# Patient Record
Sex: MALE | Race: WHITE | NOT HISPANIC OR LATINO | URBAN - METROPOLITAN AREA
[De-identification: names, ages, dates, MRNs, and addresses within clinical notes are randomized per-mention and may not be internally consistent; named-entity substitution may affect disease eponyms.]

---

## 2017-04-15 RX ORDER — AMLODIPINE BESYLATE 2.5 MG/1
0 TABLET ORAL
Qty: 90 | Refills: 0 | COMMUNITY
Start: 2017-04-15

## 2017-04-15 RX ORDER — LOSARTAN/HYDROCHLOROTHIAZIDE 100MG-25MG
1 TABLET ORAL
Qty: 90 | Refills: 0 | DISCHARGE
Start: 2017-04-15

## 2017-04-15 RX ORDER — AMLODIPINE BESYLATE 2.5 MG/1
1 TABLET ORAL
Qty: 90 | Refills: 0 | COMMUNITY
Start: 2017-04-15

## 2017-06-08 RX ORDER — LEVOTHYROXINE SODIUM 125 MCG
0 TABLET ORAL
Qty: 90 | Refills: 0 | COMMUNITY
Start: 2017-06-08

## 2017-06-08 RX ORDER — LEVOTHYROXINE SODIUM 125 MCG
1 TABLET ORAL
Qty: 90 | Refills: 0 | COMMUNITY
Start: 2017-06-08

## 2017-06-24 ENCOUNTER — INPATIENT (INPATIENT)
Facility: HOSPITAL | Age: 72
LOS: 2 days | Discharge: HOME | End: 2017-06-27
Attending: INTERNAL MEDICINE | Admitting: INTERNAL MEDICINE
Payer: COMMERCIAL

## 2017-06-24 VITALS
SYSTOLIC BLOOD PRESSURE: 188 MMHG | TEMPERATURE: 99 F | RESPIRATION RATE: 14 BRPM | HEART RATE: 37 BPM | DIASTOLIC BLOOD PRESSURE: 83 MMHG | OXYGEN SATURATION: 100 %

## 2017-06-24 DIAGNOSIS — R00.1 BRADYCARDIA, UNSPECIFIED: ICD-10-CM

## 2017-06-24 DIAGNOSIS — I10 ESSENTIAL (PRIMARY) HYPERTENSION: ICD-10-CM

## 2017-06-24 DIAGNOSIS — E03.9 HYPOTHYROIDISM, UNSPECIFIED: ICD-10-CM

## 2017-06-24 DIAGNOSIS — Z29.9 ENCOUNTER FOR PROPHYLACTIC MEASURES, UNSPECIFIED: ICD-10-CM

## 2017-06-24 LAB
ALBUMIN SERPL ELPH-MCNC: 4.5 G/DL — SIGNIFICANT CHANGE UP (ref 3.3–5)
ALP SERPL-CCNC: 66 U/L — SIGNIFICANT CHANGE UP (ref 40–120)
ALT FLD-CCNC: 20 U/L — SIGNIFICANT CHANGE UP (ref 4–41)
AST SERPL-CCNC: 23 U/L — SIGNIFICANT CHANGE UP (ref 4–40)
BASOPHILS # BLD AUTO: 0.01 K/UL — SIGNIFICANT CHANGE UP (ref 0–0.2)
BASOPHILS NFR BLD AUTO: 0.2 % — SIGNIFICANT CHANGE UP (ref 0–2)
BILIRUB SERPL-MCNC: 0.6 MG/DL — SIGNIFICANT CHANGE UP (ref 0.2–1.2)
BUN SERPL-MCNC: 30 MG/DL — HIGH (ref 7–23)
CALCIUM SERPL-MCNC: 9.8 MG/DL — SIGNIFICANT CHANGE UP (ref 8.4–10.5)
CHLORIDE SERPL-SCNC: 100 MMOL/L — SIGNIFICANT CHANGE UP (ref 98–107)
CK MB BLD-MCNC: 0.7 — SIGNIFICANT CHANGE UP (ref 0–2.5)
CK MB BLD-MCNC: 2.7 NG/ML — SIGNIFICANT CHANGE UP (ref 1–6.6)
CK MB BLD-MCNC: 3.05 NG/ML — SIGNIFICANT CHANGE UP (ref 1–6.6)
CK SERPL-CCNC: 385 U/L — HIGH (ref 30–200)
CK SERPL-CCNC: 467 U/L — HIGH (ref 30–200)
CO2 SERPL-SCNC: 26 MMOL/L — SIGNIFICANT CHANGE UP (ref 22–31)
CREAT SERPL-MCNC: 1.71 MG/DL — HIGH (ref 0.5–1.3)
EOSINOPHIL # BLD AUTO: 0.09 K/UL — SIGNIFICANT CHANGE UP (ref 0–0.5)
EOSINOPHIL NFR BLD AUTO: 1.6 % — SIGNIFICANT CHANGE UP (ref 0–6)
GLUCOSE SERPL-MCNC: 88 MG/DL — SIGNIFICANT CHANGE UP (ref 70–99)
HCT VFR BLD CALC: 42.5 % — SIGNIFICANT CHANGE UP (ref 39–50)
HGB BLD-MCNC: 13.9 G/DL — SIGNIFICANT CHANGE UP (ref 13–17)
IMM GRANULOCYTES NFR BLD AUTO: 0 % — SIGNIFICANT CHANGE UP (ref 0–1.5)
LYMPHOCYTES # BLD AUTO: 1.12 K/UL — SIGNIFICANT CHANGE UP (ref 1–3.3)
LYMPHOCYTES # BLD AUTO: 19.6 % — SIGNIFICANT CHANGE UP (ref 13–44)
MCHC RBC-ENTMCNC: 31 PG — SIGNIFICANT CHANGE UP (ref 27–34)
MCHC RBC-ENTMCNC: 32.7 % — SIGNIFICANT CHANGE UP (ref 32–36)
MCV RBC AUTO: 94.9 FL — SIGNIFICANT CHANGE UP (ref 80–100)
MONOCYTES # BLD AUTO: 0.2 K/UL — SIGNIFICANT CHANGE UP (ref 0–0.9)
MONOCYTES NFR BLD AUTO: 3.5 % — SIGNIFICANT CHANGE UP (ref 2–14)
NEUTROPHILS # BLD AUTO: 4.3 K/UL — SIGNIFICANT CHANGE UP (ref 1.8–7.4)
NEUTROPHILS NFR BLD AUTO: 75.1 % — SIGNIFICANT CHANGE UP (ref 43–77)
PLATELET # BLD AUTO: 162 K/UL — SIGNIFICANT CHANGE UP (ref 150–400)
PMV BLD: 12 FL — SIGNIFICANT CHANGE UP (ref 7–13)
POTASSIUM SERPL-MCNC: 3.9 MMOL/L — SIGNIFICANT CHANGE UP (ref 3.5–5.3)
POTASSIUM SERPL-SCNC: 3.9 MMOL/L — SIGNIFICANT CHANGE UP (ref 3.5–5.3)
PROT SERPL-MCNC: 7.6 G/DL — SIGNIFICANT CHANGE UP (ref 6–8.3)
RBC # BLD: 4.48 M/UL — SIGNIFICANT CHANGE UP (ref 4.2–5.8)
RBC # FLD: 14.1 % — SIGNIFICANT CHANGE UP (ref 10.3–14.5)
SODIUM SERPL-SCNC: 143 MMOL/L — SIGNIFICANT CHANGE UP (ref 135–145)
TROPONIN T SERPL-MCNC: < 0.06 NG/ML — SIGNIFICANT CHANGE UP (ref 0–0.06)
TROPONIN T SERPL-MCNC: < 0.06 NG/ML — SIGNIFICANT CHANGE UP (ref 0–0.06)
TSH SERPL-MCNC: 1.6 UIU/ML — SIGNIFICANT CHANGE UP (ref 0.27–4.2)
WBC # BLD: 5.72 K/UL — SIGNIFICANT CHANGE UP (ref 3.8–10.5)
WBC # FLD AUTO: 5.72 K/UL — SIGNIFICANT CHANGE UP (ref 3.8–10.5)

## 2017-06-24 PROCEDURE — 71020: CPT | Mod: 26

## 2017-06-24 RX ORDER — AMLODIPINE BESYLATE 2.5 MG/1
5 TABLET ORAL DAILY
Qty: 0 | Refills: 0 | Status: DISCONTINUED | OUTPATIENT
Start: 2017-06-24 | End: 2017-06-27

## 2017-06-24 RX ORDER — HYDROCHLOROTHIAZIDE 25 MG
12.5 TABLET ORAL DAILY
Qty: 0 | Refills: 0 | Status: DISCONTINUED | OUTPATIENT
Start: 2017-06-24 | End: 2017-06-27

## 2017-06-24 RX ORDER — SODIUM CHLORIDE 9 MG/ML
3 INJECTION INTRAMUSCULAR; INTRAVENOUS; SUBCUTANEOUS EVERY 8 HOURS
Qty: 0 | Refills: 0 | Status: DISCONTINUED | OUTPATIENT
Start: 2017-06-24 | End: 2017-06-27

## 2017-06-24 RX ORDER — LEVOTHYROXINE SODIUM 125 MCG
100 TABLET ORAL DAILY
Qty: 0 | Refills: 0 | Status: DISCONTINUED | OUTPATIENT
Start: 2017-06-24 | End: 2017-06-27

## 2017-06-24 RX ORDER — SODIUM CHLORIDE 9 MG/ML
1000 INJECTION INTRAMUSCULAR; INTRAVENOUS; SUBCUTANEOUS ONCE
Qty: 0 | Refills: 0 | Status: COMPLETED | OUTPATIENT
Start: 2017-06-24 | End: 2017-06-24

## 2017-06-24 RX ORDER — LOSARTAN POTASSIUM 100 MG/1
100 TABLET, FILM COATED ORAL DAILY
Qty: 0 | Refills: 0 | Status: DISCONTINUED | OUTPATIENT
Start: 2017-06-24 | End: 2017-06-27

## 2017-06-24 RX ORDER — HEPARIN SODIUM 5000 [USP'U]/ML
5000 INJECTION INTRAVENOUS; SUBCUTANEOUS EVERY 12 HOURS
Qty: 0 | Refills: 0 | Status: DISCONTINUED | OUTPATIENT
Start: 2017-06-24 | End: 2017-06-27

## 2017-06-24 RX ADMIN — SODIUM CHLORIDE 1000 MILLILITER(S): 9 INJECTION INTRAMUSCULAR; INTRAVENOUS; SUBCUTANEOUS at 16:42

## 2017-06-24 RX ADMIN — AMLODIPINE BESYLATE 5 MILLIGRAM(S): 2.5 TABLET ORAL at 22:52

## 2017-06-24 NOTE — ED ADULT TRIAGE NOTE - CHIEF COMPLAINT QUOTE
Patient complains of a sensation to the right side of his head while he was sitting in [employer] Patient denies chest pain nausea vomiting no c/o of sob. patient Bradycardic at triage.

## 2017-06-24 NOTE — H&P ADULT - NSHPSOCIALHISTORY_GEN_ALL_CORE
Lives with family  Work as an  for [employer]  No smoking or alcohol  Uses CPAP QHS

## 2017-06-24 NOTE — ED PROVIDER NOTE - PROGRESS NOTE DETAILS
Spoke with Dr. Han covering Dr. Fowler (pt's cards) - does not admit here  Discussed need to admit with patient & discussed risk and benefits.  Patient agreed to admission.  Discussed case w/ admitting cardiologist - agreed to admit to their service.  Tele PA contacted.

## 2017-06-24 NOTE — ED PROVIDER NOTE - ATTENDING CONTRIBUTION TO CARE
I performed a face-to-face evaluation of the patient and performed a history and physical examination. I agree with the history and physical examination.    72 M, h/o bradycardia (Cards = Geoff Fowler), p/w lightheadness and impending doom. HR 37, moderate HTN. EKG: S. mehrdad. Appears well. NAD. No LE edema. Concern for pre-syncope. Check TSH, trop. Admit for consideration for pacer.

## 2017-06-24 NOTE — ED PROVIDER NOTE - MEDICAL DECISION MAKING DETAILS
73yo male with pmh of hypothyroidism, HTN and bradycardia presents with lightheadedness - syncope work up

## 2017-06-24 NOTE — ED ADULT NURSE REASSESSMENT NOTE - NS ED NURSE REASSESS COMMENT FT1
Pt report given to ALANIS Cabrera on 7th floor. Pt remains bradycardic. RN made aware. Awaiting transport to the floor.

## 2017-06-24 NOTE — ED PROVIDER NOTE - OBJECTIVE STATEMENT
73yo male with pmh of hypothyroidism, HTN and bradycardia presents with lightheadedness. Pt states he was sitting in [employer] had sudden onset of "impending doom" felt lightheaded and unwell. PT denies cp/sob/palp, abd pain/n/v/d, fevers/chills, recent sickness and travel. Pt states similar episode about a year ago.    PMD - Dr. Jorge Rendon

## 2017-06-24 NOTE — H&P ADULT - ASSESSMENT
71 y/o M with hx of hypothyroidism, HTN, and bradycardia presents with lightheadedness admitted for bradycardia

## 2017-06-24 NOTE — ED PROVIDER NOTE - PSH
Hernia NEC  repair approximately 10 years ago  History of appendectomy  1990's  Lipoma  multiple  times - last 10 years from neck and thigh in past  Ulnar nerve entrapment  right wrist decompression - 2000

## 2017-06-24 NOTE — H&P ADULT - PROBLEM SELECTOR PLAN 1
Admit to tele  Pacer pads on patient  consider EP for possible PPM  Avoid AV shara blockers  f/u MD note

## 2017-06-24 NOTE — ED ADULT NURSE NOTE - OBJECTIVE STATEMENT
73 y/o male pt, rcvd in rm 3, aox3, ambulatory, presents to the ed with c/o "lousy feeling" on the head and lightheadedness that started this morning. Pt denies any dizziness, vision changes, chest pain, sob. Bradycardiac in the ED, HR in the high 30's/ Hx HTN. MD thomas done, SL placed, labs sent, VS as noted, connected to the monitor:sinus mehrdad, NAD, will cont to monitor

## 2017-06-24 NOTE — ED PROVIDER NOTE - PMH
Bulging disc  cervical spine 10 years ago - deneies any radicular pain  Depression  5 years  Erectile dysfunction  takes viagra prn  Hemorrhoids without mention of complications    HTN (hypertension)  10 years  Hypothyroidism  last tft's - 1 month ago  IBS (irritable bowel syndrome)    Osteoarthritis    Polycystic kidney  vague history unsure as to diagnosis, states "functioning normal "

## 2017-06-25 LAB
BASOPHILS # BLD AUTO: 0.01 K/UL — SIGNIFICANT CHANGE UP (ref 0–0.2)
BASOPHILS NFR BLD AUTO: 0.2 % — SIGNIFICANT CHANGE UP (ref 0–2)
BUN SERPL-MCNC: 34 MG/DL — HIGH (ref 7–23)
CALCIUM SERPL-MCNC: 9.7 MG/DL — SIGNIFICANT CHANGE UP (ref 8.4–10.5)
CHLORIDE SERPL-SCNC: 104 MMOL/L — SIGNIFICANT CHANGE UP (ref 98–107)
CHOLEST SERPL-MCNC: 190 MG/DL — SIGNIFICANT CHANGE UP (ref 120–199)
CO2 SERPL-SCNC: 28 MMOL/L — SIGNIFICANT CHANGE UP (ref 22–31)
CREAT SERPL-MCNC: 1.71 MG/DL — HIGH (ref 0.5–1.3)
EOSINOPHIL # BLD AUTO: 0.08 K/UL — SIGNIFICANT CHANGE UP (ref 0–0.5)
EOSINOPHIL NFR BLD AUTO: 1.8 % — SIGNIFICANT CHANGE UP (ref 0–6)
GLUCOSE SERPL-MCNC: 100 MG/DL — HIGH (ref 70–99)
HBA1C BLD-MCNC: 6.2 % — HIGH (ref 4–5.6)
HCT VFR BLD CALC: 41.3 % — SIGNIFICANT CHANGE UP (ref 39–50)
HDLC SERPL-MCNC: 77 MG/DL — HIGH (ref 35–55)
HGB BLD-MCNC: 13.6 G/DL — SIGNIFICANT CHANGE UP (ref 13–17)
IMM GRANULOCYTES NFR BLD AUTO: 0.2 % — SIGNIFICANT CHANGE UP (ref 0–1.5)
LIPID PNL WITH DIRECT LDL SERPL: 110 MG/DL — SIGNIFICANT CHANGE UP
LYMPHOCYTES # BLD AUTO: 1.22 K/UL — SIGNIFICANT CHANGE UP (ref 1–3.3)
LYMPHOCYTES # BLD AUTO: 27.6 % — SIGNIFICANT CHANGE UP (ref 13–44)
MAGNESIUM SERPL-MCNC: 2.1 MG/DL — SIGNIFICANT CHANGE UP (ref 1.6–2.6)
MCHC RBC-ENTMCNC: 31.2 PG — SIGNIFICANT CHANGE UP (ref 27–34)
MCHC RBC-ENTMCNC: 32.9 % — SIGNIFICANT CHANGE UP (ref 32–36)
MCV RBC AUTO: 94.7 FL — SIGNIFICANT CHANGE UP (ref 80–100)
MONOCYTES # BLD AUTO: 0.19 K/UL — SIGNIFICANT CHANGE UP (ref 0–0.9)
MONOCYTES NFR BLD AUTO: 4.3 % — SIGNIFICANT CHANGE UP (ref 2–14)
NEUTROPHILS # BLD AUTO: 2.91 K/UL — SIGNIFICANT CHANGE UP (ref 1.8–7.4)
NEUTROPHILS NFR BLD AUTO: 65.9 % — SIGNIFICANT CHANGE UP (ref 43–77)
PHOSPHATE SERPL-MCNC: 3.1 MG/DL — SIGNIFICANT CHANGE UP (ref 2.5–4.5)
PLATELET # BLD AUTO: 167 K/UL — SIGNIFICANT CHANGE UP (ref 150–400)
PMV BLD: 12.2 FL — SIGNIFICANT CHANGE UP (ref 7–13)
POTASSIUM SERPL-MCNC: 4.3 MMOL/L — SIGNIFICANT CHANGE UP (ref 3.5–5.3)
POTASSIUM SERPL-SCNC: 4.3 MMOL/L — SIGNIFICANT CHANGE UP (ref 3.5–5.3)
RBC # BLD: 4.36 M/UL — SIGNIFICANT CHANGE UP (ref 4.2–5.8)
RBC # FLD: 14.1 % — SIGNIFICANT CHANGE UP (ref 10.3–14.5)
SODIUM SERPL-SCNC: 143 MMOL/L — SIGNIFICANT CHANGE UP (ref 135–145)
T4 FREE SERPL-MCNC: 1.19 NG/DL — SIGNIFICANT CHANGE UP (ref 0.9–1.8)
TRIGL SERPL-MCNC: 92 MG/DL — SIGNIFICANT CHANGE UP (ref 10–149)
WBC # BLD: 4.42 K/UL — SIGNIFICANT CHANGE UP (ref 3.8–10.5)
WBC # FLD AUTO: 4.42 K/UL — SIGNIFICANT CHANGE UP (ref 3.8–10.5)

## 2017-06-25 RX ADMIN — SODIUM CHLORIDE 3 MILLILITER(S): 9 INJECTION INTRAMUSCULAR; INTRAVENOUS; SUBCUTANEOUS at 21:41

## 2017-06-25 RX ADMIN — AMLODIPINE BESYLATE 5 MILLIGRAM(S): 2.5 TABLET ORAL at 21:40

## 2017-06-25 RX ADMIN — Medication 100 MICROGRAM(S): at 05:24

## 2017-06-25 RX ADMIN — SODIUM CHLORIDE 3 MILLILITER(S): 9 INJECTION INTRAMUSCULAR; INTRAVENOUS; SUBCUTANEOUS at 15:40

## 2017-06-25 RX ADMIN — SODIUM CHLORIDE 3 MILLILITER(S): 9 INJECTION INTRAMUSCULAR; INTRAVENOUS; SUBCUTANEOUS at 05:25

## 2017-06-25 RX ADMIN — Medication 12.5 MILLIGRAM(S): at 05:24

## 2017-06-25 RX ADMIN — LOSARTAN POTASSIUM 100 MILLIGRAM(S): 100 TABLET, FILM COATED ORAL at 05:24

## 2017-06-25 NOTE — CONSULT NOTE ADULT - SUBJECTIVE AND OBJECTIVE BOX
HISTORY OF PRESENT ILLNESS:  71 y/o M with hx of hypothyroidism, HTN, and bradycardia presents with lightheadedness. Patient states he was sitting at [employer] and he felt lightheaded and a feeling that is very hard for him to explain. He felt as if something bad was going to occur.  Denies falls, LOC, chest pain, SOB, fever, chills, SOB, palpitations, melena, hematochezia, LE edema, calf tenderness, diarrhea, or constipation.       PAST MEDICAL & SURGICAL HISTORY:  Polycystic kidney: vague history unsure as to diagnosis,   Hemorrhoids without mention of complications  Depression: 5 years  Bulging disc: cervical spine 10 years ago - denies any radicular pain  Osteoarthritis  IBS (irritable bowel syndrome)  Erectile dysfunction: takes Viagra prn  Hypothyroidism: last tft&#x27;s - 1 month ago  HTN (hypertension): 10 years  Hernia NEC: repair approximately 10 years ago  Ulnar nerve entrapment: right wrist decompression - 2000  Lipoma: multiple  times - last 10 years from neck and thigh in past  History of appendectomy: 1990&#x27;s        PREVIOUS DIAGNOSTIC TESTING:    [ ] Echocardiogram: 2013   EF 70%  Nl LVS fx/ RV fx    mild MR/ TR   [ ]  Catheterization:  [ ] Stress Test:  	2013  EF 51%  normal study       MEDICATIONS:  losartan 100milliGRAM(s) Oral daily  hydrochlorothiazide 12.5milliGRAM(s) Oral daily  amLODIPine   Tablet 5milliGRAM(s) Oral daily  heparin  Injectable 5000Unit(s) SubCutaneous every 12 hours            levothyroxine 100MICROGram(s) Oral daily        Allergies    No Known Allergies    Intolerances        FAMILY HISTORY:      SOCIAL HISTORY:    [ x] Non-smoker  [ ] Former Smoker  [ ] Current Smoker  [ ] Alcohol      REVIEW OF SYSTEMS:  [ ]chest pain  [  ]shortness of breath  [  ]palpitations  [  ]syncope  [x ]near syncope [  ]diplopia  [  ]altered mental status   [  ]fevers  [ ]chills [ ]nausea  [ vomiting  [ ]abdominal pain  [ ]melena  [ ]BRBPR  [  ]epistaxis  [  ]rash  [  ]lower extremity edema          [x ] All others negative	  [ ] Unable to obtain    PHYSICAL EXAM:  T(C): 36.9, Max: 36.9 (06-25 @ 14:43)  HR: 75 (41 - 88)  BP: 119/83 (119/83 - 183/90)  RR: 16 (13 - 18)  SpO2: 95% (95% - 100%)  Wt(kg): --  I&O's Summary  I & Os for 24h ending 25 Jun 2017 07:00  =============================================  IN: 414 ml / OUT: 1200 ml / NET: -786 ml    I & Os for current day (as of 25 Jun 2017 16:42)  =============================================  IN: 0 ml / OUT: 925 ml / NET: -925 ml      Appearance: Normal no sign of acute distress   HEENT:   Normal oral mucosa, PERRL, conjunctiva clear	  Lymphatic: No lymphadenopathy  Cardiovascular: Normal S1 S2, RRR No JVD, No murmurs, No edema  Respiratory: Lungs clear to auscultation	  Gastrointestinal:  Soft, Non-tender, + BS	  Skin: No rashes, No ecchymoses, No cyanosis	  Neurologic: Non-focal  Extremities: AROM WFL's, No clubbing, cyanosis + LE edema dependent position  Vascular: Peripheral pulses palpable 2+ bilaterally    TELEMETRY: 	  NSR 30-70's  ECG:  sinus bradycardia  Non-specific intra-ventricular conduction delay  	  RADIOLOGY  CXR   clear lungs   OTHER: 	  	  LABS:	 	    CARDIAC MARKERS:      CKMB: 2.70 ng/mL (06-24 @ 22:48)    CKMB Relative Index: 0.7 (06-24 @ 22:48)                            13.6   4.42  )-----------( 167      ( 25 Jun 2017 03:30 )             41.3     06-25    143  |  104  |  34<H>  ----------------------------<  100<H>  4.3   |  28  |  1.71<H>    Ca    9.7      25 Jun 2017 03:30  Phos  3.1     06-25  Mg     2.1     06-25    TPro  7.6  /  Alb  4.5  /  TBili  0.6  /  DBili  x   /  AST  23  /  ALT  20  /  AlkPhos  66  06-24    proBNP:   Lipid Profile:   HgA1c: Hemoglobin A1C, Whole Blood: 6.2 % (06-25 @ 03:30)    TSH: Thyroid Stimulating Hormone, Serum (06.24.17 @ 16:30)    Thyroid Stimulating Hormone, Serum: 1.60 uIU/mL        ASSESSMENT/PLAN: 	  71 y/o M with hx of hypothyroidism, HTN, and bradycardia presents with lightheadedness/ near syncope.  off Beta blockers   avoid AV shara blockers  TSH nl   c/w tele monitoring  f/u Echo   further EP  recommendations based on above HISTORY OF PRESENT ILLNESS:  73 y/o M with hx of hypothyroidism, HTN ,SHRUTHI on Cpap and bradycardia presents with lightheadedness. Patient states he was sitting [employer] and he felt lightheaded and a feeling that is very hard for him to explain. He felt as if something bad was going to occur.  Denies falls, LOC, chest pain, SOB, fever, chills, SOB, palpitations, melena, hematochezia, LE edema, calf tenderness, diarrhea, or constipation.       PAST MEDICAL & SURGICAL HISTORY:  Polycystic kidney: vague history unsure as to diagnosis,   Hemorrhoids without mention of complications  Depression: 5 years  Bulging disc: cervical spine 10 years ago - denies any radicular pain  Osteoarthritis  IBS (irritable bowel syndrome)  Erectile dysfunction: takes Viagra prn  Hypothyroidism: last tft&#x27;s - 1 month ago  HTN (hypertension): 10 years  Hernia NEC: repair approximately 10 years ago  Ulnar nerve entrapment: right wrist decompression - 2000  Lipoma: multiple  times - last 10 years from neck and thigh in past  History of appendectomy: 1990&#x27;s        PREVIOUS DIAGNOSTIC TESTING:    [ ] Echocardiogram: 2013   EF 70%  Nl LVS fx/ RV fx    mild MR/ TR   [ ]  Catheterization:  [ ] Stress Test:  	2013  EF 51%  normal study       MEDICATIONS:  losartan 100milliGRAM(s) Oral daily  hydrochlorothiazide 12.5milliGRAM(s) Oral daily  amLODIPine   Tablet 5milliGRAM(s) Oral daily  heparin  Injectable 5000Unit(s) SubCutaneous every 12 hours            levothyroxine 100MICROGram(s) Oral daily        Allergies    No Known Allergies    Intolerances        FAMILY HISTORY:      SOCIAL HISTORY:    [ x] Non-smoker  [ ] Former Smoker  [ ] Current Smoker  [ ] Alcohol      REVIEW OF SYSTEMS:  [ ]chest pain  [  ]shortness of breath  [  ]palpitations  [  ]syncope  [x ]near syncope [  ]diplopia  [  ]altered mental status   [  ]fevers  [ ]chills [ ]nausea  [ vomiting  [ ]abdominal pain  [ ]melena  [ ]BRBPR  [  ]epistaxis  [  ]rash  [  ]lower extremity edema          [x ] All others negative	  [ ] Unable to obtain    PHYSICAL EXAM:  T(C): 36.9, Max: 36.9 (06-25 @ 14:43)  HR: 75 (41 - 88)  BP: 119/83 (119/83 - 183/90)  RR: 16 (13 - 18)  SpO2: 95% (95% - 100%)  Wt(kg): --  I&O's Summary  I & Os for 24h ending 25 Jun 2017 07:00  =============================================  IN: 414 ml / OUT: 1200 ml / NET: -786 ml    I & Os for current day (as of 25 Jun 2017 16:42)  =============================================  IN: 0 ml / OUT: 925 ml / NET: -925 ml      Appearance: Normal no sign of acute distress   HEENT:   Normal oral mucosa, PERRL, conjunctiva clear	  Lymphatic: No lymphadenopathy  Cardiovascular: Normal S1 S2, RRR No JVD, No murmurs, No edema  Respiratory: Lungs clear to auscultation	  Gastrointestinal:  Soft, Non-tender, + BS	  Skin: No rashes, No ecchymoses, No cyanosis	  Neurologic: Non-focal  Extremities: AROM WFL's, No clubbing, cyanosis + LE edema dependent position  Vascular: Peripheral pulses palpable 2+ bilaterally    TELEMETRY: 	  NSR 30-50's  ECG:  sinus bradycardia  Non-specific intra-ventricular conduction delay  	  RADIOLOGY  CXR   clear lungs   OTHER: 	  	  LABS:	 	    CARDIAC MARKERS:      CKMB: 2.70 ng/mL (06-24 @ 22:48)    CKMB Relative Index: 0.7 (06-24 @ 22:48)                            13.6   4.42  )-----------( 167      ( 25 Jun 2017 03:30 )             41.3     06-25    143  |  104  |  34<H>  ----------------------------<  100<H>  4.3   |  28  |  1.71<H>    Ca    9.7      25 Jun 2017 03:30  Phos  3.1     06-25  Mg     2.1     06-25    TPro  7.6  /  Alb  4.5  /  TBili  0.6  /  DBili  x   /  AST  23  /  ALT  20  /  AlkPhos  66  06-24    proBNP:   Lipid Profile:   HgA1c: Hemoglobin A1C, Whole Blood: 6.2 % (06-25 @ 03:30)    TSH: Thyroid Stimulating Hormone, Serum (06.24.17 @ 16:30)    Thyroid Stimulating Hormone, Serum: 1.60 uIU/mL        ASSESSMENT/PLAN: 	  73 y/o M with hx of hypothyroidism, SHRUTHI on CPaP HTN, and bradycardia presents with lightheadedness/ near syncope.  Reports had bradycardia while sleeping w/ work up in 2013  Symptomatic bradycardia    takes no  Beta blockers   avoid AV shara blockers  TSH nl   c/w tele monitoring  f/u Echo   further EP  recommendations based on above   will f/u W/ attending if candidate for PPM

## 2017-06-25 NOTE — PROGRESS NOTE ADULT - SUBJECTIVE AND OBJECTIVE BOX
SUBJ:73 y/o M with hx of hypothyroidism, HTN, and bradycardia presents with lightheadedness. Noted to be bradycardic in 30's,no chest pain syncope,is off BBlockers      MEDICATIONS  (STANDING):  losartan 100milliGRAM(s) Oral daily  hydrochlorothiazide 12.5milliGRAM(s) Oral daily  amLODIPine   Tablet 5milliGRAM(s) Oral daily  levothyroxine 100MICROGram(s) Oral daily  sodium chloride 0.9% lock flush 3milliLiter(s) IV Push every 8 hours  heparin  Injectable 5000Unit(s) SubCutaneous every 12 hours    Vital Signs Last 24 Hrs  T(C): 36.8, Max: 37 (06-24 @ 15:39)  T(F): 98.3, Max: 98.6 (06-24 @ 15:39)  HR: 79 (37 - 88)  BP: 146/83 (146/83 - 200/78)  RR: 18 (12 - 18)  SpO2: 96% (96% - 100%)    REVIEW OF SYSTEMS:  · CONSTITUTIONAL: no fever and no chills.	  · EYES: no discharge, no irritation, no pain, no redness, and no visual changes.	  · ENMT: Ears: no ear pain and no hearing problems.Nose: no nasal congestion and no nasal drainage.Mouth/Throat: no dysphagia, no hoarseness and no throat pain.Neck: no lumps, no pain, no stiffness and no swollen glands.	  · CARDIOVASCULAR: normal rate and rhythm, no chest pain and no edema.	  · RESPIRATORY: no chest pain, no cough, and no shortness of breath.	  · GASTROINTESTINAL: no abdominal pain, no bloating, no constipation, no diarrhea, no nausea and no vomiting.	  · GENITOURINARY: no dysuria, no frequency, and no hematuria.	  · MUSCULOSKELETAL: no back pain, no gout, no musculoskeletal pain, no neck pain, and no weakness.	  · SKIN: no abrasions, no jaundice, no lesions, no pruritis, and no rashes.	  · NEUROLOGICAL: - - -	  · Neurological [+]: lightheaded	  · Neurological [-]: no difficulty walking/imbalance, no headache, no seizure	    PHYSICAL EXAM:    · CONSTITUTIONAL: Well appearing, well nourished, awake, alert, oriented to person, place, time/situation and in no apparent distress.	  · ENMT: Airway patent, Nasal mucosa clear. Mouth with normal mucosa. Throat has no vesicles, no oropharyngeal exudates and uvula is midline.	  · HEAD: Head atraumatic, normal cephalic shape.	  · HEAD: Head is atraumatic. Head shape is symmetrical.	  · EYES: Clear bilaterally, pupils equal, round and reactive to light.	  · CARDIAC: - - -	  · Cardiac Rhythm: regular	  · Cardiac Rate: BRADYCARDIC	  · Heart Sounds: S1-S2	  · Pedal Edema: absent	  · Capillary Refill: less than or equal to 2 seconds	  · Cardiac Pulses: normal bilaterally	  · RESPIRATORY: Breath sounds clear and equal bilaterally.	  · GASTROINTESTINAL: Abdomen soft, non-tender, no guarding.	  · MUSCULOSKELETAL: Spine appears normal, range of motion is not limited, no muscle or joint tenderness	  · NEUROLOGICAL: Alert and oriented, no focal deficits, no motor or sensory deficits.	  · SKIN: Skin normal color for race, warm, dry and intact. No evidence of rash.	  TELEMETRY:    ECG:    TTE:    LABS:                        13.6   4.42  )-----------( 167      ( 25 Jun 2017 03:30 )             41.3     06-25    143  |  104  |  34<H>  ----------------------------<  100<H>  4.3   |  28  |  1.71<H>    Ca    9.7      25 Jun 2017 03:30  Phos  3.1     06-25  Mg     2.1     06-25    TPro  7.6  /  Alb  4.5  /  TBili  0.6  /  DBili  x   /  AST  23  /  ALT  20  /  AlkPhos  66  06-24    CARDIAC MARKERS ( 24 Jun 2017 22:48 )  x     / < 0.06 ng/mL / 385 u/L / 2.70 ng/mL / x      CARDIAC MARKERS ( 24 Jun 2017 16:30 )  x     / < 0.06 ng/mL / 467 u/L / 3.05 ng/mL / x              I&O's Summary    I & Os for current day (as of 25 Jun 2017 10:36)  =============================================  IN: 414 ml / OUT: 1200 ml / NET: -786 ml        IMPRESSION AND PLAN:SYMPTOMATIC BRADYCARDIA-off AV shara blockers,continue telemetry EP evaluation Dr West.

## 2017-06-25 NOTE — CONSULT NOTE ADULT - CONSULT REASON
near syncope   bradycardia near syncope   bradycardia  ----------------------  Symptomatic bradycardia

## 2017-06-26 LAB
BUN SERPL-MCNC: 34 MG/DL — HIGH (ref 7–23)
CALCIUM SERPL-MCNC: 9 MG/DL — SIGNIFICANT CHANGE UP (ref 8.4–10.5)
CHLORIDE SERPL-SCNC: 104 MMOL/L — SIGNIFICANT CHANGE UP (ref 98–107)
CO2 SERPL-SCNC: 25 MMOL/L — SIGNIFICANT CHANGE UP (ref 22–31)
CREAT SERPL-MCNC: 1.64 MG/DL — HIGH (ref 0.5–1.3)
GLUCOSE SERPL-MCNC: 90 MG/DL — SIGNIFICANT CHANGE UP (ref 70–99)
HCT VFR BLD CALC: 39.1 % — SIGNIFICANT CHANGE UP (ref 39–50)
HGB BLD-MCNC: 12.8 G/DL — LOW (ref 13–17)
MAGNESIUM SERPL-MCNC: 2.2 MG/DL — SIGNIFICANT CHANGE UP (ref 1.6–2.6)
MCHC RBC-ENTMCNC: 30.9 PG — SIGNIFICANT CHANGE UP (ref 27–34)
MCHC RBC-ENTMCNC: 32.7 % — SIGNIFICANT CHANGE UP (ref 32–36)
MCV RBC AUTO: 94.4 FL — SIGNIFICANT CHANGE UP (ref 80–100)
PLATELET # BLD AUTO: 153 K/UL — SIGNIFICANT CHANGE UP (ref 150–400)
PMV BLD: 11.9 FL — SIGNIFICANT CHANGE UP (ref 7–13)
POTASSIUM SERPL-MCNC: 3.9 MMOL/L — SIGNIFICANT CHANGE UP (ref 3.5–5.3)
POTASSIUM SERPL-SCNC: 3.9 MMOL/L — SIGNIFICANT CHANGE UP (ref 3.5–5.3)
RBC # BLD: 4.14 M/UL — LOW (ref 4.2–5.8)
RBC # FLD: 14.3 % — SIGNIFICANT CHANGE UP (ref 10.3–14.5)
SODIUM SERPL-SCNC: 144 MMOL/L — SIGNIFICANT CHANGE UP (ref 135–145)
TSH SERPL-MCNC: 1.8 UIU/ML — SIGNIFICANT CHANGE UP (ref 0.27–4.2)
WBC # BLD: 3.72 K/UL — LOW (ref 3.8–10.5)
WBC # FLD AUTO: 3.72 K/UL — LOW (ref 3.8–10.5)

## 2017-06-26 PROCEDURE — 93306 TTE W/DOPPLER COMPLETE: CPT | Mod: 26

## 2017-06-26 RX ORDER — AMLODIPINE BESYLATE 2.5 MG/1
1 TABLET ORAL
Qty: 0 | Refills: 0 | DISCHARGE
Start: 2017-06-26

## 2017-06-26 RX ORDER — LEVOTHYROXINE SODIUM 125 MCG
1 TABLET ORAL
Qty: 0 | Refills: 0 | DISCHARGE
Start: 2017-06-26

## 2017-06-26 RX ADMIN — Medication 100 MICROGRAM(S): at 10:01

## 2017-06-26 RX ADMIN — SODIUM CHLORIDE 3 MILLILITER(S): 9 INJECTION INTRAMUSCULAR; INTRAVENOUS; SUBCUTANEOUS at 22:15

## 2017-06-26 RX ADMIN — AMLODIPINE BESYLATE 5 MILLIGRAM(S): 2.5 TABLET ORAL at 22:15

## 2017-06-26 RX ADMIN — HEPARIN SODIUM 5000 UNIT(S): 5000 INJECTION INTRAVENOUS; SUBCUTANEOUS at 17:02

## 2017-06-26 RX ADMIN — Medication 12.5 MILLIGRAM(S): at 10:01

## 2017-06-26 RX ADMIN — SODIUM CHLORIDE 3 MILLILITER(S): 9 INJECTION INTRAMUSCULAR; INTRAVENOUS; SUBCUTANEOUS at 13:22

## 2017-06-26 RX ADMIN — SODIUM CHLORIDE 3 MILLILITER(S): 9 INJECTION INTRAMUSCULAR; INTRAVENOUS; SUBCUTANEOUS at 05:50

## 2017-06-26 RX ADMIN — LOSARTAN POTASSIUM 100 MILLIGRAM(S): 100 TABLET, FILM COATED ORAL at 10:01

## 2017-06-26 NOTE — DISCHARGE NOTE ADULT - ADDITIONAL INSTRUCTIONS
Follow-up with your Private Medical Doctor within 1 week. Follow-up with your Private Medical Doctor within 1 week.  Follow-up with Dr. West as an outpatient.

## 2017-06-26 NOTE — DISCHARGE NOTE ADULT - MEDICATION SUMMARY - MEDICATIONS TO TAKE
I will START or STAY ON the medications listed below when I get home from the hospital:    LOSARTAN/HCT -12.5  -- 1  by mouth once a day  -- Indication: For HTN (hypertension)    amLODIPine 5 mg oral tablet  -- 1 tab(s) by mouth once a day  -- Indication: For HTN (hypertension)    levothyroxine 100 mcg (0.1 mg) oral tablet  -- 1 tab(s) by mouth once a day  -- Indication: For Hypothyroidism

## 2017-06-26 NOTE — DISCHARGE NOTE ADULT - PLAN OF CARE
Continue to monitor. Pt declines PPM at this time.   Follow-up with your Private Medical Doctor within 1 week. Monitor thyroid function. Continue Levothyroxine. Reduce blood pressure. Continue Losartan/HCTZ.   Monitor your blood pressure.

## 2017-06-26 NOTE — DISCHARGE NOTE ADULT - HOSPITAL COURSE
73yo male with pmh of hypothyroidism, polycystic kidney disease, HTN and bradycardia presents with lightheadedness.    +Bradycardia   CEx2 neg  Cr: 1.70  EKg: sinus bradycardia at 42 BPM  CXR- NEG  6/25 CARDIO: SYMPTOMATIC BRADYCARDIA-off AV shara blockers,continue telemetry EP evaluation Dr West.  6/25 EP Brett; bradycardia presents with lightheadedness/ near syncope, takes no  Beta blockers  avoid AV shara blockers TSH nl  c/w tele monitoring f/u Echo  further EP  recommendations based on above  will f/u W/ attending if candidate for PPM.    2D Echo:   1. Mitral annular calcification, otherwise normal mitral  valve. Mild mitral regurgitation.  2. Calcified trileaflet aortic valve with normal opening.  Mild aortic regurgitation.  3. Normal left ventricular internal dimensions and wall  thicknesses.  4. Endocardium not well visualized; grossly mild global  left ventricular systolic dysfunction.  5. Normal right ventricular size and function. 73yo male with pmh of hypothyroidism, polycystic kidney disease, HTN and bradycardia presents with lightheadedness.    +Bradycardia   CEx2 neg  Cr: 1.70  EKg: sinus bradycardia at 42 BPM  CXR- NEG  6/25 CARDIO: SYMPTOMATIC BRADYCARDIA-off AV shara blockers,continue telemetry EP evaluation Dr West.  6/25 EP Brett; bradycardia presents with lightheadedness/ near syncope, takes no  Beta blockers  avoid AV shara blockers TSH nl  c/w tele monitoring f/u Echo  further EP  recommendations based on above  will f/u W/ attending if candidate for PPM.    2D Echo:   1. Mitral annular calcification, otherwise normal mitral  valve. Mild mitral regurgitation.  2. Calcified trileaflet aortic valve with normal opening.  Mild aortic regurgitation.  3. Normal left ventricular internal dimensions and wall  thicknesses.  4. Endocardium not well visualized; grossly mild global  left ventricular systolic dysfunction.  5. Normal right ventricular size and function.    6/26 Cardio: SYMPTOMATIC BRADYCARDIA-on no AV shara blockers,BP stable EP consult noted,  TTE-LVEF,pt wants to follow up with Private Cardiologist-Dr Fowler-[facility]. 73 y/o male with pmh of hypothyroidism, polycystic kidney disease, HTN and bradycardia presents with lightheadedness.     +Bradycardia   CEx 2 neg  Cr: 1.70  EKg: sinus bradycardia at 42 BPM  CXR- NEG  6/25 CARDIO: SYMPTOMATIC BRADYCARDIA-off AV shara blockers,continue telemetry EP evaluation Dr West.  6/25 EP Brett; bradycardia presents with lightheadedness/ near syncope, takes no  Beta blockers  avoid AV shara blockers TSH nl  c/w tele monitoring f/u Echo  further EP  recommendations based on above  will f/u W/ attending if candidate for PPM.    2D Echo:   1. Mitral annular calcification, otherwise normal mitral  valve. Mild mitral regurgitation.  2. Calcified trileaflet aortic valve with normal opening.  Mild aortic regurgitation.  3. Normal left ventricular internal dimensions and wall  thicknesses.  4. Endocardium not well visualized; grossly mild global  left ventricular systolic dysfunction.  5. Normal right ventricular size and function.    6/26 Cardio: SYMPTOMATIC BRADYCARDIA-on no AV shara blockers,BP stable EP consult noted.  TTE-LVEF,pt wants to follow up with Private Cardiologist-Dr Fowler-[facility].    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 He reports 1 episode of syncope in 2013 but refused PPM implantation here at [facility] at that time. He is now admitted with recurrent presyncope and documented symptomatic sick sinus syndrome.    -given the above I recommended a dual chamber for symptomatic sick sinus syndrome. I cited a 3% risk of infection or bleeding, and a 1% risk of major complication including but not limited to heart attack, stroke, death or need for emergency open heart surgery or pericardiocentesis. I discussed these recommendations with his outpatient cardiologist Dr Fowler who agrees. Understanding his R/B/A the patient elects to be discharged home (after echo) and arrange PPM as outpatient either with myself or EP Dr Isaacs. I provided my contact information and informative literature. 73 y/o male with pmh of hypothyroidism, polycystic kidney disease, HTN and bradycardia presents with lightheadedness.     +Bradycardia   CEx 2 neg  Cr: 1.70  EKg: sinus bradycardia at 42 BPM  CXR- NEG  6/25 CARDIO: SYMPTOMATIC BRADYCARDIA-off AV shara blockers,continue telemetry EP evaluation Dr West.  6/25 EP Brett; bradycardia presents with lightheadedness/ near syncope, takes no  Beta blockers  avoid AV shara blockers TSH nl  c/w tele monitoring f/u Echo  further EP  recommendations based on above  will f/u W/ attending if candidate for PPM.    2D Echo:   1. Mitral annular calcification, otherwise normal mitral  valve. Mild mitral regurgitation.  2. Calcified trileaflet aortic valve with normal opening.  Mild aortic regurgitation.  3. Normal left ventricular internal dimensions and wall  thicknesses.  4. Endocardium not well visualized; grossly mild global  left ventricular systolic dysfunction.  5. Normal right ventricular size and function.    6/26 Cardio: SYMPTOMATIC BRADYCARDIA-on no AV shara blockers,BP stable EP consult noted.  TTE-LVEF,pt wants to follow up with Private Cardiologist-Dr Fowler-[facility].    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 He reports 1 episode of syncope in 2013 but refused PPM implantation here at [facility] at that time. He is now admitted with recurrent presyncope and documented symptomatic sick sinus syndrome.    -given the above I recommended a dual chamber for symptomatic sick sinus syndrome. I cited a 3% risk of infection or bleeding, and a 1% risk of major complication including but not limited to heart attack, stroke, death or need for emergency open heart surgery or pericardiocentesis. I discussed these recommendations with his outpatient cardiologist Dr Fowler who agrees. Understanding his R/B/A the patient elects to be discharged home (after echo) and arrange PPM as outpatient either with myself or EP Dr Isaacs. I provided my contact information and informative literature.    6/27/17 Cardiac cath - luminal LAD. RRB removed at 13:00.  RRA site without bleeding, no hematoma, 2+ radial pulse palpable   6/27/17 Pt is medically stable for discharge home today as per Dr. Rendon.

## 2017-06-26 NOTE — PROGRESS NOTE ADULT - SUBJECTIVE AND OBJECTIVE BOX
EP ATTENDING    Tele: sinus mehrdad 40s    No palpitations, no syncope, no angina    losartan 100milliGRAM(s) Oral daily  hydrochlorothiazide 12.5milliGRAM(s) Oral daily  amLODIPine   Tablet 5milliGRAM(s) Oral daily  levothyroxine 100MICROGram(s) Oral daily  sodium chloride 0.9% lock flush 3milliLiter(s) IV Push every 8 hours  heparin  Injectable 5000Unit(s) SubCutaneous every 12 hours                            12.8   3.72  )-----------( 153      ( 26 Jun 2017 07:03 )             39.1       06-26    144  |  104  |  34<H>  ----------------------------<  90  3.9   |  25  |  1.64<H>    Ca    9.0      26 Jun 2017 07:03  Phos  3.1     06-25  Mg     2.2     06-26    TPro  7.6  /  Alb  4.5  /  TBili  0.6  /  DBili  x   /  AST  23  /  ALT  20  /  AlkPhos  66  06-24      CARDIAC MARKERS ( 24 Jun 2017 22:48 )  x     / < 0.06 ng/mL / 385 u/L / 2.70 ng/mL / x      CARDIAC MARKERS ( 24 Jun 2017 16:30 )  x     / < 0.06 ng/mL / 467 u/L / 3.05 ng/mL / x            T(C): 36.5, Max: 36.9 (06-25 @ 14:43)  HR: 40 (40 - 88)  BP: 152/95 (119/83 - 152/95)  RR: 18 (15 - 18)  SpO2: 98% (95% - 100%)  Wt(kg): --    no JVD  bradycardia, no murmurs  CTAB  soft nt/nd  no c/c/e    A/P) 71 y/o male PMH hypothyroidism, HTN, and 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 He reports 1 episode of syncope in 2013 but refused PPM implantation here at [facility] at that time. He is now admitted with recurrent presyncope and documented symptomatic sick sinus syndrome.    -given the above I recommended a dual chamber for symptomatic sick sinus syndrome. I cited a 3% risk of infection or bleeding, and a 1% risk of major complication including but not limited to heart attack, stroke, death or need for emergency open heart surgery or pericardiocentesis. I discussed these recommendations with his outpatient cardiologist Dr Fowler who agrees. Understanding his R/B/A the patient elects to be discharged home (after echo) and arrange PPM as outpatient either with myself or EP Dr Isaacs. I provided my contact information and informative literature.    Benedict West MD, RS  [phone]

## 2017-06-26 NOTE — DISCHARGE NOTE ADULT - CARE PLAN
Principal Discharge DX:	Symptomatic bradycardia  Goal:	Continue to monitor.  Instructions for follow-up, activity and diet:	Pt declines PPM at this time.   Follow-up with your Private Medical Doctor within 1 week.  Secondary Diagnosis:	Hypothyroidism  Goal:	Monitor thyroid function.  Instructions for follow-up, activity and diet:	Continue Levothyroxine.  Secondary Diagnosis:	HTN (hypertension)  Goal:	Reduce blood pressure.  Instructions for follow-up, activity and diet:	Continue Losartan/HCTZ.   Monitor your blood pressure.

## 2017-06-26 NOTE — PROGRESS NOTE ADULT - SUBJECTIVE AND OBJECTIVE BOX
SUBJ:71 y/o M with hx of hypothyroidism, HTN, and bradycardia presents with lightheadedness.Remains bradycardic no chest pain dyspnea.EP consult noted.      MEDICATIONS  (STANDING):  losartan 100milliGRAM(s) Oral daily  hydrochlorothiazide 12.5milliGRAM(s) Oral daily  amLODIPine   Tablet 5milliGRAM(s) Oral daily  levothyroxine 100MICROGram(s) Oral daily  sodium chloride 0.9% lock flush 3milliLiter(s) IV Push every 8 hours  heparin  Injectable 5000Unit(s) SubCutaneous every 12 hours      Vital Signs Last 24 Hrs  T(C): 36.5, Max: 36.9 (06-25 @ 14:43)  T(F): 97.7, Max: 98.5 (06-25 @ 14:43)  HR: 43 (41 - 88)  BP: 152/95 (119/83 - 152/95)  BP(mean): 111 (111 - 111)  RR: 18 (15 - 18)  SpO2: 99% (95% - 100%)    REVIEW OF SYSTEMS:    PHYSICAL EXAM:  CONSTITUTIONAL:BMI-28  .  EYES:Anicteric [ ]EOMI.  ENMT:No oral lesions.  NECK:[ ]Bruits [ ]Thyroid [ ]JVD.  RESPIRATORY:Equal air entry bilaterally,[x ]Clear to auscultation[ ]Rales[ ]Rhonchi[ ]Wheeze.  CARDIOVASCULAR:Regular rhythm[ ]Rub[x ]Murmur- 2/6 systolic             [x ]Normal PMI.  GASTROINTESTINAL:[x ]BS[ ]Tenderness[ ]Reboumd[ ]Ridgitity[ ]Organomegaly.  EXTREMITIES:[ ]Clubbing[ ]Cyanosis[ ]Edema.  VASCULAR:[ ]Pulses intact and symmetrical.  NEUROLOGICAL:Alert &OrientetedX 3[x ]Normal strength[x ]NoFocal deficit.  SKIN:[ ]Lesions[ ]Rash.  MUSCULOSKEKETAL:[ ]Calf tenderness[ ]Joint abnormalities.     TELEMETRY:Sinus rhythm sinus bradycardia,    ECG:Marked sinus bradycardia Ventricular Rate 42 BPM .Non-specific intra-ventricular conduction delay .QRS Duration 122 ms    TTE:Study Date: 7/29/2013--EjectionFraction: 70 %Normal left ventricular systolic function. No segmental wall motion abnormalities. Mild diastolic dysfunction (Stage I).    LABS:                        13.6   4.42  )-----------( 167      ( 25 Jun 2017 03:30 )             41.3     06-25    143  |  104  |  34<H>  ----------------------------<  100<H>  4.3   |  28  |  1.71<H>    Ca    9.7      25 Jun 2017 03:30  Phos  3.1     06-25  Mg     2.1     06-25    TPro  7.6  /  Alb  4.5  /  TBili  0.6  /  DBili  x   /  AST  23  /  ALT  20  /  AlkPhos  66  06-24    CARDIAC MARKERS ( 24 Jun 2017 22:48 )  x     / < 0.06 ng/mL / 385 u/L / 2.70 ng/mL / x      CARDIAC MARKERS ( 24 Jun 2017 16:30 )  x     / < 0.06 ng/mL / 467 u/L / 3.05 ng/mL / x        I&O's Summary  I & Os for 24h ending 25 Jun 2017 07:00  =============================================  IN: 414 ml / OUT: 1200 ml / NET: -786 ml    I & Os for current day (as of 26 Jun 2017 06:42)  =============================================  IN: 0 ml / OUT: 925 ml / NET: -925 ml      IMPRESSION AND PLAN:SYMPTOMATIC BRADYCARDIA-on no AV shara blockers,BP stable EP consult noted,    TTE-LVEF,pt wants to follow up with [facility]

## 2017-06-26 NOTE — DISCHARGE NOTE ADULT - OTHER SIGNIFICANT FINDINGS
(Admit Diagnosis) Bradycardia  (PMH) Polycystic kidney  (PMH) Bulging disc  (PMH) Hemorrhoids without mention of complications  (PMH) Depression  (PMH) Osteoarthritis  (PMH) IBS (irritable bowel syndrome)  (PMH) Erectile dysfunction  (PMH) Hypothyroidism  (PMH) HTN (hypertension)  (PSH) Hernia NEC  (PSH) Ulnar nerve entrapment  (PSH) Lipoma  (PSH) History of appendectomy

## 2017-06-26 NOTE — DISCHARGE NOTE ADULT - PATIENT PORTAL LINK FT
“You can access the Follow[facility] Patient Portal, offered by [facility], by registering with the following website: [email or web address]”

## 2017-06-26 NOTE — DISCHARGE NOTE ADULT - CARE PROVIDER_API CALL
Benedict West), Cardiac Electrophysiology; Cardiovascular Disease; Internal Medicine; Nuclear Cardiology  [address]  [place] [address]  Phone: [phone]  Fax: [phone]

## 2017-06-27 VITALS
RESPIRATION RATE: 16 BRPM | HEART RATE: 41 BPM | SYSTOLIC BLOOD PRESSURE: 150 MMHG | DIASTOLIC BLOOD PRESSURE: 86 MMHG | OXYGEN SATURATION: 100 % | TEMPERATURE: 98 F

## 2017-06-27 LAB
BASOPHILS # BLD AUTO: 0.01 K/UL — SIGNIFICANT CHANGE UP (ref 0–0.2)
BASOPHILS NFR BLD AUTO: 0.3 % — SIGNIFICANT CHANGE UP (ref 0–2)
BUN SERPL-MCNC: 41 MG/DL — HIGH (ref 7–23)
CALCIUM SERPL-MCNC: 9.2 MG/DL — SIGNIFICANT CHANGE UP (ref 8.4–10.5)
CHLORIDE SERPL-SCNC: 104 MMOL/L — SIGNIFICANT CHANGE UP (ref 98–107)
CO2 SERPL-SCNC: 25 MMOL/L — SIGNIFICANT CHANGE UP (ref 22–31)
CREAT SERPL-MCNC: 1.6 MG/DL — HIGH (ref 0.5–1.3)
EOSINOPHIL # BLD AUTO: 0.07 K/UL — SIGNIFICANT CHANGE UP (ref 0–0.5)
EOSINOPHIL NFR BLD AUTO: 2.2 % — SIGNIFICANT CHANGE UP (ref 0–6)
GLUCOSE SERPL-MCNC: 96 MG/DL — SIGNIFICANT CHANGE UP (ref 70–99)
HCT VFR BLD CALC: 38.7 % — LOW (ref 39–50)
HGB BLD-MCNC: 12.8 G/DL — LOW (ref 13–17)
IMM GRANULOCYTES NFR BLD AUTO: 0.3 % — SIGNIFICANT CHANGE UP (ref 0–1.5)
LYMPHOCYTES # BLD AUTO: 1.29 K/UL — SIGNIFICANT CHANGE UP (ref 1–3.3)
LYMPHOCYTES # BLD AUTO: 40.1 % — SIGNIFICANT CHANGE UP (ref 13–44)
MAGNESIUM SERPL-MCNC: 2.2 MG/DL — SIGNIFICANT CHANGE UP (ref 1.6–2.6)
MCHC RBC-ENTMCNC: 31.1 PG — SIGNIFICANT CHANGE UP (ref 27–34)
MCHC RBC-ENTMCNC: 33.1 % — SIGNIFICANT CHANGE UP (ref 32–36)
MCV RBC AUTO: 93.9 FL — SIGNIFICANT CHANGE UP (ref 80–100)
MONOCYTES # BLD AUTO: 0.1 K/UL — SIGNIFICANT CHANGE UP (ref 0–0.9)
MONOCYTES NFR BLD AUTO: 3.1 % — SIGNIFICANT CHANGE UP (ref 2–14)
NEUTROPHILS # BLD AUTO: 1.74 K/UL — LOW (ref 1.8–7.4)
NEUTROPHILS NFR BLD AUTO: 54 % — SIGNIFICANT CHANGE UP (ref 43–77)
PLATELET # BLD AUTO: 145 K/UL — LOW (ref 150–400)
PMV BLD: 12.4 FL — SIGNIFICANT CHANGE UP (ref 7–13)
POTASSIUM SERPL-MCNC: 3.6 MMOL/L — SIGNIFICANT CHANGE UP (ref 3.5–5.3)
POTASSIUM SERPL-SCNC: 3.6 MMOL/L — SIGNIFICANT CHANGE UP (ref 3.5–5.3)
RBC # BLD: 4.12 M/UL — LOW (ref 4.2–5.8)
RBC # FLD: 14.1 % — SIGNIFICANT CHANGE UP (ref 10.3–14.5)
SODIUM SERPL-SCNC: 142 MMOL/L — SIGNIFICANT CHANGE UP (ref 135–145)
WBC # BLD: 3.22 K/UL — LOW (ref 3.8–10.5)
WBC # FLD AUTO: 3.22 K/UL — LOW (ref 3.8–10.5)

## 2017-06-27 PROCEDURE — 93454 CORONARY ARTERY ANGIO S&I: CPT | Mod: 26

## 2017-06-27 RX ORDER — SODIUM CHLORIDE 9 MG/ML
1000 INJECTION INTRAMUSCULAR; INTRAVENOUS; SUBCUTANEOUS
Qty: 0 | Refills: 0 | Status: DISCONTINUED | OUTPATIENT
Start: 2017-06-27 | End: 2017-06-27

## 2017-06-27 RX ADMIN — SODIUM CHLORIDE 3 MILLILITER(S): 9 INJECTION INTRAMUSCULAR; INTRAVENOUS; SUBCUTANEOUS at 16:19

## 2017-06-27 RX ADMIN — Medication 100 MICROGRAM(S): at 05:31

## 2017-06-27 RX ADMIN — Medication 12.5 MILLIGRAM(S): at 05:31

## 2017-06-27 RX ADMIN — SODIUM CHLORIDE 100 MILLILITER(S): 9 INJECTION INTRAMUSCULAR; INTRAVENOUS; SUBCUTANEOUS at 08:31

## 2017-06-27 RX ADMIN — LOSARTAN POTASSIUM 100 MILLIGRAM(S): 100 TABLET, FILM COATED ORAL at 05:31

## 2017-06-27 RX ADMIN — SODIUM CHLORIDE 3 MILLILITER(S): 9 INJECTION INTRAMUSCULAR; INTRAVENOUS; SUBCUTANEOUS at 05:32

## 2017-06-27 NOTE — PROGRESS NOTE ADULT - SUBJECTIVE AND OBJECTIVE BOX
SUBJ:71 y/o M with hx of hypothyroidism, HTN, and bradycardia presents with lightheadedness.Remains bradycardic no chest pain dyspnea.EP consult noted.Had TTE-Mild globa      MEDICATIONS  (STANDING):  losartan 100 milliGRAM(s) Oral daily  hydrochlorothiazide 12.5 milliGRAM(s) Oral daily  amLODIPine   Tablet 5 milliGRAM(s) Oral daily  levothyroxine 100 MICROGram(s) Oral daily  sodium chloride 0.9% lock flush 3 milliLiter(s) IV Push every 8 hours  heparin  Injectable 5000 Unit(s) SubCutaneous every 12 hours    Vital Signs Last 24 Hrs  T(C): 36.8 (27 Jun 2017 05:28), Max: 36.9 (26 Jun 2017 13:34)  T(F): 98.2 (27 Jun 2017 05:28), Max: 98.5 (26 Jun 2017 13:34)  HR: 41 (27 Jun 2017 05:28) (41 - 50)  BP: 144/73 (27 Jun 2017 05:28) (132/86 - 167/94  RR: 18 (27 Jun 2017 05:28) (18 - 18)  SpO2: 100% (27 Jun 2017 05:28) (100% - 100%)    REVIEW OF SYSTEMS:  NO CP, SOB, BANSAL, PND, orthopnea, palpitations, diaphoresis, lightheadedness, dizziness, syncope, increased lower extremity edema, fever chills, malaise, myalgias, anorexia, weight changes ( loss or gain), night sweats, generalized fatigue abdominal pain, N/V/C/D BRBPR, melena, urinary symptoms, cough, and wheezing.    PHYSICAL EXAM:    CONSTITUTIONAL:BMI-28  .  EYES:Anicteric [ ]EOMI.  ENMT:No oral lesions.  NECK:[ ]Bruits [ ]Thyroid [ ]JVD.  RESPIRATORY:Equal air entry bilaterally,[x ]Clear to auscultation[ ]Rales[ ]Rhonchi[ ]Wheeze.  CARDIOVASCULAR:Regular rhythm[ ]Rub[x ]Murmur- 2/6 systolic             [x ]Normal PMI.  GASTROINTESTINAL:[x ]BS[ ]Tenderness[ ]Reboumd[ ]Ridgitity[ ]Organomegaly.  EXTREMITIES:[ ]Clubbing[ ]Cyanosis[ ]Edema.  VASCULAR:[ ]Pulses intact and symmetrical.  NEUROLOGICAL:Alert &OrientetedX 3[x ]Normal strength[x ]NoFocal deficit.  SKIN:[ ]Lesions[ ]Rash.  MUSCULOSKEKETAL:[ ]Calf tenderness[ ]Joint abnormalities.    	  TELEMETRY:Sinus rhythm sinus bradycardia    ECG:    TTE:Study Date: 6/26/2017  Endocardium not well visualized; grossly mild global left ventricular systolic dysfunction.Ejection Fraction (Teicholtz): 51 %        LABS:                        12.8   3.72  )-----------( 153      ( 26 Jun 2017 07:03 )             39.1     06-26    144  |  104  |  34<H>  ----------------------------<  90  3.9   |  25  |  1.64<H>    Ca    9.0      26 Jun 2017 07:03  Mg     2.2     06-26    I&O's Summary    26 Jun 2017 07:01  -  27 Jun 2017 07:00  --------------------------------------------------------  IN: 575 mL / OUT: 0 mL / NET: 575 mL      IMPRESSION AND PLAN:SYMPTOMATIC BRADYCARDIA- Mild global LV dysfunction for ischemic work-up Cardiac Cath today.

## 2017-06-27 NOTE — PROGRESS NOTE ADULT - SUBJECTIVE AND OBJECTIVE BOX
EP ATTENDING    Tele: sinus mehrdad 40s    Echo showed mild LV dysfunction (LVEF 50%)    No palpitations, no syncope, no angina    losartan 100milliGRAM(s) Oral daily  hydrochlorothiazide 12.5milliGRAM(s) Oral daily  amLODIPine   Tablet 5milliGRAM(s) Oral daily  levothyroxine 100MICROGram(s) Oral daily  sodium chloride 0.9% lock flush 3milliLiter(s) IV Push every 8 hours  heparin  Injectable 5000Unit(s) SubCutaneous every 12 hours                            12.8   3.72  )-----------( 153      ( 26 Jun 2017 07:03 )             39.1       06-26    144  |  104  |  34<H>  ----------------------------<  90  3.9   |  25  |  1.64<H>    Ca    9.0      26 Jun 2017 07:03  Phos  3.1     06-25  Mg     2.2     06-26    TPro  7.6  /  Alb  4.5  /  TBili  0.6  /  DBili  x   /  AST  23  /  ALT  20  /  AlkPhos  66  06-24      CARDIAC MARKERS ( 24 Jun 2017 22:48 )  x     / < 0.06 ng/mL / 385 u/L / 2.70 ng/mL / x      CARDIAC MARKERS ( 24 Jun 2017 16:30 )  x     / < 0.06 ng/mL / 467 u/L / 3.05 ng/mL / x            T(C): 36.5, Max: 36.9 (06-25 @ 14:43)  HR: 40 (40 - 88)  BP: 152/95 (119/83 - 152/95)  RR: 18 (15 - 18)  SpO2: 98% (95% - 100%)  Wt(kg): --    no JVD  bradycardia, no murmurs  CTAB  soft nt/nd  no c/c/e    A/P) 71 y/o male PMH hypothyroidism, HTN, and 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 He reports 1 episode of syncope in 2013 but refused PPM implantation here at [facility] at that time. He is now admitted with recurrent presyncope and documented symptomatic sick sinus syndrome. Echo shows LVEF 50%    -diagnostic cath today as per cardiology  -given the above I recommended a dual chamber for symptomatic sick sinus syndrome - especially given that he will now require beta blockers for LV dysfunction. I cited a 3% risk of infection or bleeding, and a 1% risk of major complication including but not limited to heart attack, stroke, death or need for emergency open heart surgery or pericardiocentesis. I discussed these recommendations with his outpatient cardiologist Dr Fowler who agrees. Understanding his R/B/A the patient elects to be discharged home (after cath) and arrange PPM as outpatient either with myself or EP Dr Isaacs. I provided my contact information and informative literature.    Benedict West MD, RS  [phone]

## 2018-12-19 ENCOUNTER — EMERGENCY (EMERGENCY)
Facility: HOSPITAL | Age: 73
LOS: 1 days | Discharge: HOME | End: 2018-12-19
Attending: EMERGENCY MEDICINE | Admitting: EMERGENCY MEDICINE
Payer: COMMERCIAL

## 2018-12-19 VITALS
DIASTOLIC BLOOD PRESSURE: 97 MMHG | RESPIRATION RATE: 18 BRPM | HEART RATE: 55 BPM | SYSTOLIC BLOOD PRESSURE: 155 MMHG | TEMPERATURE: 98 F | OXYGEN SATURATION: 100 %

## 2018-12-19 VITALS
TEMPERATURE: 98 F | SYSTOLIC BLOOD PRESSURE: 143 MMHG | OXYGEN SATURATION: 100 % | DIASTOLIC BLOOD PRESSURE: 87 MMHG | RESPIRATION RATE: 18 BRPM | HEART RATE: 50 BPM

## 2018-12-19 PROCEDURE — 99284 EMERGENCY DEPT VISIT MOD MDM: CPT | Mod: 25

## 2018-12-19 PROCEDURE — 70450 CT HEAD/BRAIN W/O DYE: CPT | Mod: 26

## 2018-12-19 NOTE — ED ADULT TRIAGE NOTE - CHIEF COMPLAINT QUOTE
pt fell and hit head on corner of a piece of furniture, pt is on eliquis for afib, large hematoma noted to left upper side of head, c/o slight h/a, no loc. denies blurry vision/n/v/dizzness. pmh afib, sleep apnea, htn.

## 2018-12-19 NOTE — ED ADULT NURSE NOTE - OBJECTIVE STATEMENT
Patient presents today after hitting head during fall , on elliquis. He is alert and oriented x 4, PERRLA, lungs are clear bilaterally, S1, S2 regular, abdomen is soft and nontender, ambulatory, no bruises, no abrasions noted, no Iv access. Will monitor.

## 2018-12-19 NOTE — ED PROVIDER NOTE - NS ED ROS FT
REVIEW OF SYSTEMS:  CONSTITUTIONAL: No fever, weight loss, or fatigue  EYES: No eye pain, visual disturbances, or discharge  ENMT:  No difficulty hearing, tinnitus, vertigo; No sinus or throat pain  NECK: No pain or stiffness  RESPIRATORY: No cough, wheezing, chills or hemoptysis; No shortness of breath  CARDIOVASCULAR: No chest pain, palpitations, dizziness, or leg swelling  GASTROINTESTINAL: No abdominal or epigastric pain. No nausea, vomiting, or hematemesis; No diarrhea or constipation. No melena or hematochezia.  GENITOURINARY: No dysuria, frequency, hematuria, or incontinence  NEUROLOGICAL: No headaches, memory loss, loss of strength, numbness, or tremors  SKIN: + swelling on head  LYMPH NODES: No enlarged glands  ENDOCRINE: No heat or cold intolerance; No hair loss  MUSCULOSKELETAL: No joint pain or swelling; No muscle, back, or extremity pain  PSYCHIATRIC: No depression, anxiety, mood swings, or difficulty sleeping  HEME/LYMPH: No easy bruising, or bleeding gums  ALLERY AND IMMUNOLOGIC: No hives or eczema

## 2018-12-19 NOTE — ED ADULT NURSE NOTE - ED STAT RN HANDOFF DETAILS
Patient is discharged in stable condition, ambulatory. As per patient, He will be taking bus to go home. He endorses that he feels good.

## 2018-12-19 NOTE — ED PROVIDER NOTE - PHYSICAL EXAMINATION
PHYSICAL EXAM:  GENERAL: NAD, well-groomed, well-developed  HEAD:+ small hematoma  EYES: EOMI, PERRLA, conjunctiva and sclera clear  ENMT: No tonsillar erythema, exudates, or enlargement; Moist mucous membranes, Good dentition, No lesions  NECK: Supple  CHEST/LUNG: Clear to percussion bilaterally; No rales, rhonchi, wheezing, or rubs  HEART: Regular rate and rhythm; No murmurs, rubs, or gallops  ABDOMEN: Soft, Nontender, Nondistended; Bowel sounds present  EXTREMITIES:  2+ Peripheral Pulses, No clubbing, cyanosis, or edema  LYMPH: No lymphadenopathy noted  SKIN: No rashes or lesions  NERVOUS SYSTEM:  Alert & Oriented X3

## 2018-12-19 NOTE — ED ADULT NURSE NOTE - CHPI ED NUR SYMPTOMS NEG
no blurred vision/no vomiting/no change in level of consciousness/no dizziness/no confusion/no loss of consciousness/no syncope/no seizure/no weakness/no nausea

## 2018-12-19 NOTE — ED ADULT NURSE NOTE - NSIMPLEMENTINTERV_GEN_ALL_ED
Implemented All Fall with Harm Risk Interventions:  [place] to call system. Call bell, personal items and telephone within reach. Instruct patient to call for assistance. Room bathroom lighting operational. Non-slip footwear when patient is off stretcher. Physically safe environment: no spills, clutter or unnecessary equipment. Stretcher in lowest position, wheels locked, appropriate side rails in place. Provide visual cue, wrist band, yellow gown, etc. Monitor gait and stability. Monitor for mental status changes and reorient to person, place, and time. Review medications for side effects contributing to fall risk. Reinforce activity limits and safety measures with patient and family. Provide visual clues: red socks.

## 2018-12-19 NOTE — ED PROVIDER NOTE - OBJECTIVE STATEMENT
73 y.o Male pmhx hypothyroidism, HTN, a.fib on a/c, PPM p/w mechanical fall, hitting his head on the furniture. He was doing heel-to-toe exercise when he fell. Denies HA, N/V, CP, sob, palpitations, dizziness, abdominal pain, changes in bowel habits. All other ROS is negative. Took his eliquis today.

## 2018-12-19 NOTE — ED PROVIDER NOTE - ATTENDING CONTRIBUTION TO CARE
pt with mechanical fall at home and struck head.  No LOC.  On Eloquis for a-fib.  No visual changes    Gen:  Well appearning in NAD  Head:  NC - there is a contusion to the scalp.    Resp: No distress   Ext: no deformities  Neuro: A&O non focal  Skin: warm and dry as visualized     Pt with CHI after fall on AC.  Will get CT to ensure no bleed

## 2019-11-16 ENCOUNTER — OUTPATIENT (OUTPATIENT)
Dept: OUTPATIENT SERVICES | Facility: HOSPITAL | Age: 74
LOS: 1 days | End: 2019-11-16
Payer: COMMERCIAL

## 2019-11-16 ENCOUNTER — APPOINTMENT (OUTPATIENT)
Dept: SLEEP CENTER | Facility: CLINIC | Age: 74
End: 2019-11-16
Payer: COMMERCIAL

## 2019-11-16 PROCEDURE — 95810 POLYSOM 6/> YRS 4/> PARAM: CPT | Mod: 26

## 2019-11-16 PROCEDURE — 95810 POLYSOM 6/> YRS 4/> PARAM: CPT

## 2019-11-18 DIAGNOSIS — G47.33 OBSTRUCTIVE SLEEP APNEA (ADULT) (PEDIATRIC): ICD-10-CM

## 2020-11-02 NOTE — ED PROVIDER NOTE - NS ED MD DISPO ADMIT LIJ UNIT
PT to ED for COVID Testing. no symptoms, known exposure. PT evaluated by PA. Verbal consent for email/text results given. Verbalized understanding of COVID d/c instructions.
TELE

## 2021-02-05 ENCOUNTER — EMERGENCY (EMERGENCY)
Facility: HOSPITAL | Age: 76
LOS: 1 days | Discharge: OTHER INSTITUTION | End: 2021-02-05
Attending: EMERGENCY MEDICINE
Payer: COMMERCIAL

## 2021-02-05 VITALS
DIASTOLIC BLOOD PRESSURE: 78 MMHG | OXYGEN SATURATION: 97 % | HEART RATE: 79 BPM | SYSTOLIC BLOOD PRESSURE: 120 MMHG | RESPIRATION RATE: 18 BRPM | TEMPERATURE: 98 F

## 2021-02-05 VITALS
DIASTOLIC BLOOD PRESSURE: 80 MMHG | SYSTOLIC BLOOD PRESSURE: 130 MMHG | OXYGEN SATURATION: 95 % | HEART RATE: 88 BPM | RESPIRATION RATE: 16 BRPM | HEIGHT: 66 IN

## 2021-02-05 LAB
ALBUMIN SERPL ELPH-MCNC: 2.9 G/DL — LOW (ref 3.3–5)
ALP SERPL-CCNC: 71 U/L — SIGNIFICANT CHANGE UP (ref 40–120)
ALT FLD-CCNC: 18 U/L — SIGNIFICANT CHANGE UP (ref 10–45)
ANION GAP SERPL CALC-SCNC: 15 MMOL/L — SIGNIFICANT CHANGE UP (ref 5–17)
ANION GAP SERPL CALC-SCNC: 15 MMOL/L — SIGNIFICANT CHANGE UP (ref 5–17)
APPEARANCE UR: CLEAR — SIGNIFICANT CHANGE UP
APTT BLD: 34.7 SEC — SIGNIFICANT CHANGE UP (ref 27.5–35.5)
AST SERPL-CCNC: 33 U/L — SIGNIFICANT CHANGE UP (ref 10–40)
BACTERIA # UR AUTO: NEGATIVE — SIGNIFICANT CHANGE UP
BASE EXCESS BLDV CALC-SCNC: 3.3 MMOL/L — HIGH (ref -2–2)
BASOPHILS # BLD AUTO: 0 K/UL — SIGNIFICANT CHANGE UP (ref 0–0.2)
BASOPHILS NFR BLD AUTO: 0 % — SIGNIFICANT CHANGE UP (ref 0–2)
BILIRUB SERPL-MCNC: 0.7 MG/DL — SIGNIFICANT CHANGE UP (ref 0.2–1.2)
BILIRUB UR-MCNC: NEGATIVE — SIGNIFICANT CHANGE UP
BLD GP AB SCN SERPL QL: NEGATIVE — SIGNIFICANT CHANGE UP
BUN SERPL-MCNC: 64 MG/DL — HIGH (ref 7–23)
BUN SERPL-MCNC: 73 MG/DL — HIGH (ref 7–23)
BURR CELLS BLD QL SMEAR: PRESENT — SIGNIFICANT CHANGE UP
CA-I SERPL-SCNC: 1.15 MMOL/L — SIGNIFICANT CHANGE UP (ref 1.12–1.3)
CALCIUM SERPL-MCNC: 8.4 MG/DL — SIGNIFICANT CHANGE UP (ref 8.4–10.5)
CALCIUM SERPL-MCNC: 9.2 MG/DL — SIGNIFICANT CHANGE UP (ref 8.4–10.5)
CHLORIDE BLDV-SCNC: 95 MMOL/L — LOW (ref 96–108)
CHLORIDE SERPL-SCNC: 94 MMOL/L — LOW (ref 96–108)
CHLORIDE SERPL-SCNC: 95 MMOL/L — LOW (ref 96–108)
CO2 BLDV-SCNC: 29 MMOL/L — SIGNIFICANT CHANGE UP (ref 22–30)
CO2 SERPL-SCNC: 20 MMOL/L — LOW (ref 22–31)
CO2 SERPL-SCNC: 24 MMOL/L — SIGNIFICANT CHANGE UP (ref 22–31)
COLOR SPEC: YELLOW — SIGNIFICANT CHANGE UP
CREAT SERPL-MCNC: 2.14 MG/DL — HIGH (ref 0.5–1.3)
CREAT SERPL-MCNC: 2.74 MG/DL — HIGH (ref 0.5–1.3)
DACRYOCYTES BLD QL SMEAR: SLIGHT — SIGNIFICANT CHANGE UP
DIFF PNL FLD: NEGATIVE — SIGNIFICANT CHANGE UP
ELLIPTOCYTES BLD QL SMEAR: SLIGHT — SIGNIFICANT CHANGE UP
EOSINOPHIL # BLD AUTO: 0 K/UL — SIGNIFICANT CHANGE UP (ref 0–0.5)
EOSINOPHIL NFR BLD AUTO: 0 % — SIGNIFICANT CHANGE UP (ref 0–6)
EPI CELLS # UR: 1 /HPF — SIGNIFICANT CHANGE UP
GAS PNL BLDV: 132 MMOL/L — LOW (ref 135–145)
GAS PNL BLDV: SIGNIFICANT CHANGE UP
GAS PNL BLDV: SIGNIFICANT CHANGE UP
GIANT PLATELETS BLD QL SMEAR: PRESENT — SIGNIFICANT CHANGE UP
GLUCOSE BLDV-MCNC: 131 MG/DL — HIGH (ref 70–99)
GLUCOSE SERPL-MCNC: 114 MG/DL — HIGH (ref 70–99)
GLUCOSE SERPL-MCNC: 132 MG/DL — HIGH (ref 70–99)
GLUCOSE UR QL: NEGATIVE — SIGNIFICANT CHANGE UP
HCO3 BLDV-SCNC: 27 MMOL/L — SIGNIFICANT CHANGE UP (ref 21–29)
HCT VFR BLD CALC: 39.1 % — SIGNIFICANT CHANGE UP (ref 39–50)
HCT VFR BLDA CALC: 43 % — SIGNIFICANT CHANGE UP (ref 39–50)
HGB BLD CALC-MCNC: 14 G/DL — SIGNIFICANT CHANGE UP (ref 13–17)
HGB BLD-MCNC: 13.1 G/DL — SIGNIFICANT CHANGE UP (ref 13–17)
HYALINE CASTS # UR AUTO: 5 /LPF — HIGH (ref 0–2)
INR BLD: 1.48 RATIO — HIGH (ref 0.88–1.16)
KETONES UR-MCNC: NEGATIVE — SIGNIFICANT CHANGE UP
LACTATE BLDV-MCNC: 2.2 MMOL/L — HIGH (ref 0.7–2)
LACTATE BLDV-MCNC: 2.2 MMOL/L — HIGH (ref 0.7–2)
LACTATE BLDV-MCNC: 3.3 MMOL/L — HIGH (ref 0.7–2)
LEUKOCYTE ESTERASE UR-ACNC: NEGATIVE — SIGNIFICANT CHANGE UP
LYMPHOCYTES # BLD AUTO: 0.11 K/UL — LOW (ref 1–3.3)
LYMPHOCYTES # BLD AUTO: 1.7 % — LOW (ref 13–44)
MAGNESIUM SERPL-MCNC: 2.4 MG/DL — SIGNIFICANT CHANGE UP (ref 1.6–2.6)
MANUAL SMEAR VERIFICATION: SIGNIFICANT CHANGE UP
MCHC RBC-ENTMCNC: 30.1 PG — SIGNIFICANT CHANGE UP (ref 27–34)
MCHC RBC-ENTMCNC: 33.5 GM/DL — SIGNIFICANT CHANGE UP (ref 32–36)
MCV RBC AUTO: 89.9 FL — SIGNIFICANT CHANGE UP (ref 80–100)
MONOCYTES # BLD AUTO: 0.06 K/UL — SIGNIFICANT CHANGE UP (ref 0–0.9)
MONOCYTES NFR BLD AUTO: 0.9 % — LOW (ref 2–14)
NEUTROPHILS # BLD AUTO: 6.2 K/UL — SIGNIFICANT CHANGE UP (ref 1.8–7.4)
NEUTROPHILS NFR BLD AUTO: 97.4 % — HIGH (ref 43–77)
NITRITE UR-MCNC: NEGATIVE — SIGNIFICANT CHANGE UP
NRBC # BLD: 1 /100 — HIGH (ref 0–0)
PCO2 BLDV: 41 MMHG — SIGNIFICANT CHANGE UP (ref 35–50)
PH BLDV: 7.44 — SIGNIFICANT CHANGE UP (ref 7.35–7.45)
PH UR: 6 — SIGNIFICANT CHANGE UP (ref 5–8)
PLAT MORPH BLD: NORMAL — SIGNIFICANT CHANGE UP
PLATELET # BLD AUTO: 224 K/UL — SIGNIFICANT CHANGE UP (ref 150–400)
PO2 BLDV: 34 MMHG — SIGNIFICANT CHANGE UP (ref 25–45)
POIKILOCYTOSIS BLD QL AUTO: SLIGHT — SIGNIFICANT CHANGE UP
POTASSIUM BLDV-SCNC: 3.2 MMOL/L — LOW (ref 3.5–5.3)
POTASSIUM SERPL-MCNC: 3.2 MMOL/L — LOW (ref 3.5–5.3)
POTASSIUM SERPL-MCNC: 4.3 MMOL/L — SIGNIFICANT CHANGE UP (ref 3.5–5.3)
POTASSIUM SERPL-SCNC: 3.2 MMOL/L — LOW (ref 3.5–5.3)
POTASSIUM SERPL-SCNC: 4.3 MMOL/L — SIGNIFICANT CHANGE UP (ref 3.5–5.3)
PROT SERPL-MCNC: 6.8 G/DL — SIGNIFICANT CHANGE UP (ref 6–8.3)
PROT UR-MCNC: ABNORMAL
PROTHROM AB SERPL-ACNC: 17.4 SEC — HIGH (ref 10.6–13.6)
RBC # BLD: 4.35 M/UL — SIGNIFICANT CHANGE UP (ref 4.2–5.8)
RBC # FLD: 13 % — SIGNIFICANT CHANGE UP (ref 10.3–14.5)
RBC BLD AUTO: ABNORMAL
RBC CASTS # UR COMP ASSIST: 4 /HPF — SIGNIFICANT CHANGE UP (ref 0–4)
RH IG SCN BLD-IMP: POSITIVE — SIGNIFICANT CHANGE UP
SAO2 % BLDV: 59 % — LOW (ref 67–88)
SARS-COV-2 RNA SPEC QL NAA+PROBE: DETECTED
SODIUM SERPL-SCNC: 130 MMOL/L — LOW (ref 135–145)
SODIUM SERPL-SCNC: 133 MMOL/L — LOW (ref 135–145)
SP GR SPEC: 1.01 — SIGNIFICANT CHANGE UP (ref 1.01–1.02)
UROBILINOGEN FLD QL: NEGATIVE — SIGNIFICANT CHANGE UP
WBC # BLD: 6.37 K/UL — SIGNIFICANT CHANGE UP (ref 3.8–10.5)
WBC # FLD AUTO: 6.37 K/UL — SIGNIFICANT CHANGE UP (ref 3.8–10.5)
WBC UR QL: 1 /HPF — SIGNIFICANT CHANGE UP (ref 0–5)

## 2021-02-05 PROCEDURE — 71045 X-RAY EXAM CHEST 1 VIEW: CPT

## 2021-02-05 PROCEDURE — 82330 ASSAY OF CALCIUM: CPT

## 2021-02-05 PROCEDURE — 93005 ELECTROCARDIOGRAM TRACING: CPT

## 2021-02-05 PROCEDURE — U0003: CPT

## 2021-02-05 PROCEDURE — 84295 ASSAY OF SERUM SODIUM: CPT

## 2021-02-05 PROCEDURE — 86901 BLOOD TYPING SEROLOGIC RH(D): CPT

## 2021-02-05 PROCEDURE — 82435 ASSAY OF BLOOD CHLORIDE: CPT

## 2021-02-05 PROCEDURE — 86900 BLOOD TYPING SEROLOGIC ABO: CPT

## 2021-02-05 PROCEDURE — 71045 X-RAY EXAM CHEST 1 VIEW: CPT | Mod: 26

## 2021-02-05 PROCEDURE — U0005: CPT

## 2021-02-05 PROCEDURE — 82803 BLOOD GASES ANY COMBINATION: CPT

## 2021-02-05 PROCEDURE — 87086 URINE CULTURE/COLONY COUNT: CPT

## 2021-02-05 PROCEDURE — 70450 CT HEAD/BRAIN W/O DYE: CPT | Mod: 26,MA

## 2021-02-05 PROCEDURE — 86850 RBC ANTIBODY SCREEN: CPT

## 2021-02-05 PROCEDURE — 96361 HYDRATE IV INFUSION ADD-ON: CPT

## 2021-02-05 PROCEDURE — 85610 PROTHROMBIN TIME: CPT

## 2021-02-05 PROCEDURE — 70450 CT HEAD/BRAIN W/O DYE: CPT

## 2021-02-05 PROCEDURE — 96360 HYDRATION IV INFUSION INIT: CPT

## 2021-02-05 PROCEDURE — 83605 ASSAY OF LACTIC ACID: CPT

## 2021-02-05 PROCEDURE — 83735 ASSAY OF MAGNESIUM: CPT

## 2021-02-05 PROCEDURE — 99285 EMERGENCY DEPT VISIT HI MDM: CPT | Mod: 25

## 2021-02-05 PROCEDURE — 84132 ASSAY OF SERUM POTASSIUM: CPT

## 2021-02-05 PROCEDURE — 82947 ASSAY GLUCOSE BLOOD QUANT: CPT

## 2021-02-05 PROCEDURE — 85730 THROMBOPLASTIN TIME PARTIAL: CPT

## 2021-02-05 PROCEDURE — 99285 EMERGENCY DEPT VISIT HI MDM: CPT

## 2021-02-05 PROCEDURE — 85014 HEMATOCRIT: CPT

## 2021-02-05 PROCEDURE — 80053 COMPREHEN METABOLIC PANEL: CPT

## 2021-02-05 PROCEDURE — 81001 URINALYSIS AUTO W/SCOPE: CPT

## 2021-02-05 PROCEDURE — 85025 COMPLETE CBC W/AUTO DIFF WBC: CPT

## 2021-02-05 PROCEDURE — 80048 BASIC METABOLIC PNL TOTAL CA: CPT

## 2021-02-05 PROCEDURE — 85018 HEMOGLOBIN: CPT

## 2021-02-05 RX ORDER — SODIUM CHLORIDE 9 MG/ML
1000 INJECTION, SOLUTION INTRAVENOUS ONCE
Refills: 0 | Status: COMPLETED | OUTPATIENT
Start: 2021-02-05 | End: 2021-02-05

## 2021-02-05 RX ADMIN — SODIUM CHLORIDE 1000 MILLILITER(S): 9 INJECTION, SOLUTION INTRAVENOUS at 22:04

## 2021-02-05 RX ADMIN — SODIUM CHLORIDE 1000 MILLILITER(S): 9 INJECTION, SOLUTION INTRAVENOUS at 16:58

## 2021-02-05 RX ADMIN — SODIUM CHLORIDE 1000 MILLILITER(S): 9 INJECTION, SOLUTION INTRAVENOUS at 14:55

## 2021-02-05 NOTE — ED PROVIDER NOTE - PROGRESS NOTE DETAILS
ED workup so far suggestive of ELIEZER, likely secondary to poor PO intake and pt fighting off infection. patchy BL PNA likely secondary to COVID as pt tested positive recently. pt unsteady on his feet and does not feel safe going home. will admit pt with transfer for load balancing. transfer process began via CTC. Attending aware. Patient aware. - Tolu Vidal PA-C This patient meets criteria for severe covid-19 illness and requires hospitalization. In accordance with the current institutional initiative for covid hospitalization load balancing, this patient will be transferred to be admitted to one of our sister facilities for ongoing treatment. The patient is stable and appropriate for transfer and was informed. Patient to be transferred to [facility] with subsequent admission to floor accepting Dr. Tolu Francis. - Tolu Vidal PA-C

## 2021-02-05 NOTE — ED ADULT NURSE NOTE - OBJECTIVE STATEMENT
75 y.o M A&OX3 with PMH of polycystic kidney, depression, bulging disc, osteoarthritis, IBS, erectile dysfunction, hypothyroidism, HTN, presents to the ED c.o weakness and frequent falls. Pt. reports he has been experiencing generalized weakness after testing positive for covid. States he has fallen multiple times over the last couple of weeks. Today fall was witnessed by wife. Wife helped pt. to grown. Denies any injuries from fall today. States he is on blood thinners. Pt. speaking in full coherent sentences. Denies HA, dizziness, vision changes, n/v/d, ABD pain, sensory deficits, numbness/tingling, chills, and CP. Gross neuro intact. Safety and comfort provided. 75 y.o M A&OX3 with PMH of polycystic kidney, depression, bulging disc, osteoarthritis, IBS, erectile dysfunction, hypothyroidism, HTN, presents to the ED c.o weakness and frequent falls. Pt. reports he has been experiencing generalized weakness after testing positive for covid. States he has fallen multiple times over the last couple of weeks. Today fall was witnessed by wife. Wife helped pt. to grown. Denies any injuries from fall today. States he is on blood thinners. Pt. speaking in full coherent sentences. Denies HA, dizziness, vision changes, n/v/d, ABD pain, sensory deficits, numbness/tingling, chills, and CP. Gross neuro intact. PERRL - 2 mm bilaterally. Pos. strength noted to all extremities. Safety and comfort provided. 75 y.o M A&OX3 with PMH of polycystic kidney, depression, bulging disc, osteoarthritis, IBS, erectile dysfunction, hypothyroidism, HTN, presents to the ED c.o weakness and frequent falls. Pt. reports he has been experiencing generalized weakness after testing positive for covid. States he has fallen multiple times over the last couple of weeks. Today fall was witnessed by wife. Wife helped pt. to grown. Denies any injuries from fall today. States he is on blood thinners. Pt. speaking in full coherent sentences. Denies HA, dizziness, vision changes, n/v/d, ABD pain, sensory deficits, numbness/tingling, chills, and CP. Gross neuro intact. PERRL - 2 mm bilaterally. Pos. strength noted to all extremities. Neuro checks being completed and placed in pt. paper chart. Safety and comfort provided. 75 y.o M A&OX3 with PMH of pacemaker, a fib (on Eliquis), polycystic kidney, depression, bulging disc, osteoarthritis, IBS, erectile dysfunction, hypothyroidism, HTN, presents to the ED c.o weakness and frequent falls. Pt. reports he has been experiencing generalized weakness after testing positive for covid. States he has fallen multiple times over the last couple of weeks. Today fall was witnessed by wife. Wife helped pt. to grown. Denies any injuries from fall today. States he is on blood thinners. Pt. speaking in full coherent sentences. Denies HA, dizziness, vision changes, n/v/d, ABD pain, sensory deficits, numbness/tingling, chills, and CP. Gross neuro intact. PERRL - 2 mm bilaterally. Pos. strength noted to all extremities. Neuro checks being completed and placed in pt. paper chart. Safety and comfort provided.

## 2021-02-05 NOTE — ED ADULT NURSE NOTE - NS ED NURSE RECORD ANOTHER HT AND WT
Social Work Note:   used, Winnie, [phone]    Patient is a 17 year old female domiciled with her parents.  Patient is currently enrolled in home schooling.  Patient was referred to the ER by out-patient psychiatrist for "medication changes".    Patient has no history of in-patient psychiatric hospitalizations.  Patient has been under the care of a psychiatrist for five years due to anxiety and auditory hallucinations; psychiatrist, Dr. LALA, [phone].  Patient is currently prescribed: Trazodone 50mg; Abilify 5mg; Risperidone 2mg; clonazepam.  Mother stated that the Abilify was recently added to medications.  Mother spoke to patient's psychiatrist today about patient continuing endorse auditory hallucinations.  Last night, patient had a panic attack, and then reported that the voice in her head was telling her to hurt herself.  According to mother, patient's psychiatrist told mother to bring patient to the ER for medication adjustments, as they just adjusted and does not seem to be helping.     Mother denied patient endorsing suicidal or homicidal ideations.  Denied patient engaging in self-harm and denied suicidal ideations.  Mother stated that patient endorses chronic auditory hallucinations; girl voice telling her mean things and to hurt herself.  Patient does not want to hurt herself and "tries to ignore the voice".  Denied visual hallucinations.  Denied manic symptoms.  Patient is at baseline with hygiene and sleep.  Patient's appetite had decreased a little.  Denied trauma or ACS.    Patient is currently residing with her mother and father.  Mother described patient as a "very happy and shy girl".  Denied any aggressive behaviors.  Patient does have friends, but chooses not to be around them as of lately due to her friends not wearing masks.  Patient spends a lot of time with her mother, and enjoys going on walks and painting. Mother stated that she herself suffered from anxiety in the past.    Patient is home schooled due to her anxiety.  Mother stated that patient gets two to three hours of school per day due to her anxiety.  Patient is baseline with academics.  Patient was described by mother as a "perfectionist".    Plan for patient is to be discharged back to her mother.  Patient will follow up with out-patient providers.  Discussed hospitalization, but mother did not want patient in hospital and had no safety concerns during ER visit.  Safety planning was completed with mother. Yes

## 2021-02-05 NOTE — ED PROVIDER NOTE - PHYSICAL EXAMINATION
Attn - alert, nad, head - NC/AT, no facial tenderness, mandible and TMJ are NT, tongue - no trauma, PERRL 3 mm, nose - NT, no deformity or signs of epistaxis, neck/spine/back - NT, Chest/ribs - NT, PPM left upper chest, Lungs - clear, good BS bilaterally without splinting, Cor - RR, no M, Abdo - soft, NT, ND, no HSM or tenderness, no ecchymosis or signs of trauma, no CVAT, Pelvis/hips - NT, and no pain with AROM.  UExt - FROM without pain, NT, LExt - FROM without pain, NT,  Neuro - CN, motor, sensory, cerebellar, speech intact and non focal

## 2021-02-05 NOTE — ED PROVIDER NOTE - RESPIRATORY, MLM
Breath sounds clear and equal bilaterally. Skin Substitute Text: The defect edges were debeveled with a #15 scalpel blade.  Given the location of the defect, shape of the defect and the proximity to free margins a skin substitute graft was deemed most appropriate.  The graft material was trimmed to fit the size of the defect. The graft was then placed in the primary defect and oriented appropriately.

## 2021-02-05 NOTE — ED PROVIDER NOTE - CLINICAL SUMMARY MEDICAL DECISION MAKING FREE TEXT BOX
Attn - pt with recent COVID and now with weakness, fatigue, frequent falls, dizziness.  DDx - electrolyte v ICH v subacute CVA - CT head, labs, reassess.

## 2021-02-05 NOTE — ED PROVIDER NOTE - OBJECTIVE STATEMENT
75y m PMHx PCKD, pacemaker, HTN, DM, SHRUTHI, appendectomy, hernia p/w falls. pt reports frequent falls for 3 weeks, was diagnosed with COVID "a few thursdays ago." pts wife called ems concern for fall today - pt recalls sitting in the chair, falling in and out of sleep and woke up on the floor. pt is insure if he hit his head and fall was unwitnessed. reports symptoms of generalized weakness since the COVID diagnosis but also endorses several months of these symptoms along with unintentional weight loss. Denies CP, SOB, cough, hemoptysis, dizziness, LOC, urinary symptoms, tongue biting, incontinence, or difficulty walking. 75y m PMHx PCKD, pacemaker, HTN, DM, SHRUTHI, appendectomy, hernia p/w falls. pt reports frequent falls for 3 weeks, was diagnosed with COVID "a few thursdays ago." pts wife called ems concern for fall today - pt recalls sitting in the chair, falling in and out of sleep and woke up on the floor. pt is insure if he hit his head and fall was unwitnessed. reports symptoms of generalized weakness since the COVID diagnosis but also endorses several months of these symptoms along with unintentional weight loss. Denies CP, SOB, cough, hemoptysis, dizziness, LOC, urinary symptoms, tongue biting, incontinence, or difficulty walking.      Attn - pt seen in [place] [facility] - agree with above - pt c/o general weakness and fatigue and falling with unsteady gait the last few weeks.  sustained injury to R lateral ribs - resolved.  NO: ha, spine pain, chest/rib pain, abdo pain, palp, extrem injury, numbness or weakness. 75y m PMHx PCKD, pacemaker, HTN, DM, SHRUTHI, A fib on eliquis, appendectomy, hernia p/w falls. pt reports frequent falls for 3 weeks, was diagnosed with COVID "a few thursdays ago." pts wife called ems concern for fall today - pt recalls sitting in the chair, falling in and out of sleep and woke up on the floor. pt is insure if he hit his head and fall was unwitnessed. reports symptoms of generalized weakness since the COVID diagnosis but also endorses several months of these symptoms along with unintentional weight loss. Denies CP, SOB, cough, hemoptysis, dizziness, LOC, urinary symptoms, tongue biting, incontinence, or difficulty walking.      Attn - pt seen in [place] - agree with above - pt c/o general weakness and fatigue and falling with unsteady gait the last few weeks.  sustained injury to R lateral ribs - resolved.  NO: ha, spine pain, chest/rib pain, abdo pain, palp, extrem injury, numbness or weakness.

## 2021-02-05 NOTE — ED PROVIDER NOTE - CARE PLAN
Principal Discharge DX:	ELIEZER (acute kidney injury)  Secondary Diagnosis:	Frequent falls  Secondary Diagnosis:	COVID-19

## 2021-02-05 NOTE — ED ADULT NURSE NOTE - NSIMPLEMENTINTERV_GEN_ALL_ED
Implemented All Fall with Harm Risk Interventions:  [place] to call system. Call bell, personal items and telephone within reach. Instruct patient to call for assistance. Room bathroom lighting operational. Non-slip footwear when patient is off stretcher. Physically safe environment: no spills, clutter or unnecessary equipment. Stretcher in lowest position, wheels locked, appropriate side rails in place. Provide visual cue, wrist band, yellow gown, etc. Monitor gait and stability. Monitor for mental status changes and reorient to person, place, and time. Review medications for side effects contributing to fall risk. Reinforce activity limits and safety measures with patient and family. Provide visual clues: red socks.

## 2021-02-05 NOTE — ED ADULT NURSE REASSESSMENT NOTE - NS ED NURSE REASSESS COMMENT FT1
PT asking repetitive questions. PT again made aware he will be transferred to [facility]. PT provided food. Urinal at bedside. Side rails up.

## 2021-02-05 NOTE — ED ADULT NURSE REASSESSMENT NOTE - NS ED NURSE REASSESS COMMENT FT1
Report received form ALANIS Alston. PT A/O x3. PT made aware he may be transferred. PT well appearing- no signs of respiratory distress/ discomfort. PT repositioned- HOB elevated to 45 degrees. Side rails up. Call bell at bedside. Food provided.

## 2021-02-05 NOTE — ED ADULT NURSE REASSESSMENT NOTE - NS ED NURSE REASSESS COMMENT FT1
Ok for pt. to eat as per MD order. Pt. sitting up in stretcher eating and drinking. Tolerating PO well.

## 2021-02-05 NOTE — ED ADULT NURSE NOTE - ISOLATION INDICATION [place]ORNE CONTACT
Clear bilaterally, pupils equal, round and reactive to light. No photophobia bilaterally Other Specify

## 2021-02-06 ENCOUNTER — INPATIENT (INPATIENT)
Facility: HOSPITAL | Age: 76
LOS: 3 days | Discharge: HOME | DRG: 177 | End: 2021-02-10
Attending: HOSPITALIST | Admitting: STUDENT IN AN ORGANIZED HEALTH CARE EDUCATION/TRAINING PROGRAM
Payer: COMMERCIAL

## 2021-02-06 VITALS
RESPIRATION RATE: 18 BRPM | DIASTOLIC BLOOD PRESSURE: 81 MMHG | WEIGHT: 159.17 LBS | TEMPERATURE: 99 F | HEART RATE: 82 BPM | SYSTOLIC BLOOD PRESSURE: 134 MMHG | HEIGHT: 66 IN | OXYGEN SATURATION: 92 %

## 2021-02-06 DIAGNOSIS — N17.9 ACUTE KIDNEY FAILURE, UNSPECIFIED: ICD-10-CM

## 2021-02-06 DIAGNOSIS — I10 ESSENTIAL (PRIMARY) HYPERTENSION: ICD-10-CM

## 2021-02-06 DIAGNOSIS — R53.1 WEAKNESS: ICD-10-CM

## 2021-02-06 DIAGNOSIS — U07.1 COVID-19: ICD-10-CM

## 2021-02-06 DIAGNOSIS — E03.9 HYPOTHYROIDISM, UNSPECIFIED: ICD-10-CM

## 2021-02-06 DIAGNOSIS — K59.00 CONSTIPATION, UNSPECIFIED: ICD-10-CM

## 2021-02-06 DIAGNOSIS — I48.91 UNSPECIFIED ATRIAL FIBRILLATION: ICD-10-CM

## 2021-02-06 DIAGNOSIS — E87.1 HYPO-OSMOLALITY AND HYPONATREMIA: ICD-10-CM

## 2021-02-06 DIAGNOSIS — Z29.9 ENCOUNTER FOR PROPHYLACTIC MEASURES, UNSPECIFIED: ICD-10-CM

## 2021-02-06 LAB
A1C WITH ESTIMATED AVERAGE GLUCOSE RESULT: 6.1 % — HIGH (ref 4–5.6)
ALBUMIN SERPL ELPH-MCNC: 2.4 G/DL — LOW (ref 3.3–5)
ALP SERPL-CCNC: 65 U/L — SIGNIFICANT CHANGE UP (ref 40–120)
ALT FLD-CCNC: 21 U/L — SIGNIFICANT CHANGE UP (ref 12–78)
ANION GAP SERPL CALC-SCNC: 9 MMOL/L — SIGNIFICANT CHANGE UP (ref 5–17)
AST SERPL-CCNC: 31 U/L — SIGNIFICANT CHANGE UP (ref 15–37)
BASOPHILS # BLD AUTO: 0 K/UL — SIGNIFICANT CHANGE UP (ref 0–0.2)
BASOPHILS NFR BLD AUTO: 0 % — SIGNIFICANT CHANGE UP (ref 0–2)
BILIRUB SERPL-MCNC: 0.9 MG/DL — SIGNIFICANT CHANGE UP (ref 0.2–1.2)
BUN SERPL-MCNC: 64 MG/DL — HIGH (ref 7–23)
CALCIUM SERPL-MCNC: 8.9 MG/DL — SIGNIFICANT CHANGE UP (ref 8.5–10.1)
CHLORIDE SERPL-SCNC: 101 MMOL/L — SIGNIFICANT CHANGE UP (ref 96–108)
CO2 SERPL-SCNC: 29 MMOL/L — SIGNIFICANT CHANGE UP (ref 22–31)
CREAT SERPL-MCNC: 2.3 MG/DL — HIGH (ref 0.5–1.3)
CULTURE RESULTS: NO GROWTH — SIGNIFICANT CHANGE UP
D DIMER BLD IA.RAPID-MCNC: 231 NG/ML DDU — HIGH
EOSINOPHIL # BLD AUTO: 0 K/UL — SIGNIFICANT CHANGE UP (ref 0–0.5)
EOSINOPHIL NFR BLD AUTO: 0 % — SIGNIFICANT CHANGE UP (ref 0–6)
ESTIMATED AVERAGE GLUCOSE: 128 MG/DL — HIGH (ref 68–114)
FERRITIN SERPL-MCNC: 968 NG/ML — HIGH (ref 30–400)
GLUCOSE SERPL-MCNC: 82 MG/DL — SIGNIFICANT CHANGE UP (ref 70–99)
HCT VFR BLD CALC: 38.1 % — LOW (ref 39–50)
HGB BLD-MCNC: 13.1 G/DL — SIGNIFICANT CHANGE UP (ref 13–17)
IMM GRANULOCYTES NFR BLD AUTO: 0.6 % — SIGNIFICANT CHANGE UP (ref 0–1.5)
LDH SERPL L TO P-CCNC: 597 U/L — HIGH (ref 50–242)
LYMPHOCYTES # BLD AUTO: 0.27 K/UL — LOW (ref 1–3.3)
LYMPHOCYTES # BLD AUTO: 5.4 % — LOW (ref 13–44)
MAGNESIUM SERPL-MCNC: 2.4 MG/DL — SIGNIFICANT CHANGE UP (ref 1.6–2.6)
MCHC RBC-ENTMCNC: 30.9 PG — SIGNIFICANT CHANGE UP (ref 27–34)
MCHC RBC-ENTMCNC: 34.4 GM/DL — SIGNIFICANT CHANGE UP (ref 32–36)
MCV RBC AUTO: 89.9 FL — SIGNIFICANT CHANGE UP (ref 80–100)
MONOCYTES # BLD AUTO: 0.06 K/UL — SIGNIFICANT CHANGE UP (ref 0–0.9)
MONOCYTES NFR BLD AUTO: 1.2 % — LOW (ref 2–14)
NEUTROPHILS # BLD AUTO: 4.64 K/UL — SIGNIFICANT CHANGE UP (ref 1.8–7.4)
NEUTROPHILS NFR BLD AUTO: 92.8 % — HIGH (ref 43–77)
NRBC # BLD: 0 /100 WBCS — SIGNIFICANT CHANGE UP (ref 0–0)
PHOSPHATE SERPL-MCNC: 2.1 MG/DL — LOW (ref 2.5–4.5)
PLATELET # BLD AUTO: 258 K/UL — SIGNIFICANT CHANGE UP (ref 150–400)
POTASSIUM SERPL-MCNC: 3.6 MMOL/L — SIGNIFICANT CHANGE UP (ref 3.5–5.3)
POTASSIUM SERPL-SCNC: 3.6 MMOL/L — SIGNIFICANT CHANGE UP (ref 3.5–5.3)
PROCALCITONIN SERPL-MCNC: 0.5 NG/ML — HIGH (ref 0–0.04)
PROT SERPL-MCNC: 6.9 G/DL — SIGNIFICANT CHANGE UP (ref 6–8.3)
RBC # BLD: 4.24 M/UL — SIGNIFICANT CHANGE UP (ref 4.2–5.8)
RBC # FLD: 13.1 % — SIGNIFICANT CHANGE UP (ref 10.3–14.5)
SODIUM SERPL-SCNC: 139 MMOL/L — SIGNIFICANT CHANGE UP (ref 135–145)
SPECIMEN SOURCE: SIGNIFICANT CHANGE UP
TSH SERPL-MCNC: 0.39 UIU/ML — SIGNIFICANT CHANGE UP (ref 0.36–3.74)
WBC # BLD: 5 K/UL — SIGNIFICANT CHANGE UP (ref 3.8–10.5)
WBC # FLD AUTO: 5 K/UL — SIGNIFICANT CHANGE UP (ref 3.8–10.5)

## 2021-02-06 PROCEDURE — 99223 1ST HOSP IP/OBS HIGH 75: CPT | Mod: GC,AI

## 2021-02-06 PROCEDURE — 76770 US EXAM ABDO BACK WALL COMP: CPT | Mod: 26

## 2021-02-06 RX ORDER — APIXABAN 2.5 MG/1
1 TABLET, FILM COATED ORAL
Qty: 0 | Refills: 0 | DISCHARGE

## 2021-02-06 RX ORDER — GLUCAGON INJECTION, SOLUTION 0.5 MG/.1ML
1 INJECTION, SOLUTION SUBCUTANEOUS ONCE
Refills: 0 | Status: DISCONTINUED | OUTPATIENT
Start: 2021-02-06 | End: 2021-02-06

## 2021-02-06 RX ORDER — APIXABAN 2.5 MG/1
2.5 TABLET, FILM COATED ORAL
Refills: 0 | Status: DISCONTINUED | OUTPATIENT
Start: 2021-02-06 | End: 2021-02-07

## 2021-02-06 RX ORDER — LEVOTHYROXINE SODIUM 125 MCG
100 TABLET ORAL DAILY
Refills: 0 | Status: DISCONTINUED | OUTPATIENT
Start: 2021-02-06 | End: 2021-02-07

## 2021-02-06 RX ORDER — ACETAMINOPHEN 500 MG
650 TABLET ORAL EVERY 4 HOURS
Refills: 0 | Status: DISCONTINUED | OUTPATIENT
Start: 2021-02-06 | End: 2021-02-10

## 2021-02-06 RX ORDER — POLYETHYLENE GLYCOL 3350 17 G/17G
17 POWDER, FOR SOLUTION ORAL DAILY
Refills: 0 | Status: DISCONTINUED | OUTPATIENT
Start: 2021-02-06 | End: 2021-02-10

## 2021-02-06 RX ORDER — SODIUM CHLORIDE 9 MG/ML
1000 INJECTION, SOLUTION INTRAVENOUS
Refills: 0 | Status: DISCONTINUED | OUTPATIENT
Start: 2021-02-06 | End: 2021-02-06

## 2021-02-06 RX ORDER — APIXABAN 2.5 MG/1
5 TABLET, FILM COATED ORAL
Refills: 0 | Status: DISCONTINUED | OUTPATIENT
Start: 2021-02-06 | End: 2021-02-06

## 2021-02-06 RX ORDER — DEXTROSE 50 % IN WATER 50 %
15 SYRINGE (ML) INTRAVENOUS ONCE
Refills: 0 | Status: DISCONTINUED | OUTPATIENT
Start: 2021-02-06 | End: 2021-02-06

## 2021-02-06 RX ORDER — METOPROLOL TARTRATE 50 MG
1 TABLET ORAL
Qty: 0 | Refills: 0 | DISCHARGE

## 2021-02-06 RX ORDER — DEXTROSE 50 % IN WATER 50 %
25 SYRINGE (ML) INTRAVENOUS ONCE
Refills: 0 | Status: DISCONTINUED | OUTPATIENT
Start: 2021-02-06 | End: 2021-02-06

## 2021-02-06 RX ORDER — DEXTROSE 50 % IN WATER 50 %
12.5 SYRINGE (ML) INTRAVENOUS ONCE
Refills: 0 | Status: DISCONTINUED | OUTPATIENT
Start: 2021-02-06 | End: 2021-02-06

## 2021-02-06 RX ORDER — METOPROLOL TARTRATE 50 MG
25 TABLET ORAL DAILY
Refills: 0 | Status: DISCONTINUED | OUTPATIENT
Start: 2021-02-06 | End: 2021-02-10

## 2021-02-06 RX ORDER — SENNA PLUS 8.6 MG/1
2 TABLET ORAL AT BEDTIME
Refills: 0 | Status: DISCONTINUED | OUTPATIENT
Start: 2021-02-06 | End: 2021-02-10

## 2021-02-06 RX ORDER — ENOXAPARIN SODIUM 100 MG/ML
40 INJECTION SUBCUTANEOUS DAILY
Refills: 0 | Status: DISCONTINUED | OUTPATIENT
Start: 2021-02-06 | End: 2021-02-06

## 2021-02-06 RX ORDER — INSULIN LISPRO 100/ML
VIAL (ML) SUBCUTANEOUS AT BEDTIME
Refills: 0 | Status: DISCONTINUED | OUTPATIENT
Start: 2021-02-06 | End: 2021-02-06

## 2021-02-06 RX ORDER — SODIUM CHLORIDE 9 MG/ML
1000 INJECTION INTRAMUSCULAR; INTRAVENOUS; SUBCUTANEOUS
Refills: 0 | Status: DISCONTINUED | OUTPATIENT
Start: 2021-02-06 | End: 2021-02-09

## 2021-02-06 RX ORDER — AMLODIPINE BESYLATE 2.5 MG/1
5 TABLET ORAL DAILY
Refills: 0 | Status: DISCONTINUED | OUTPATIENT
Start: 2021-02-06 | End: 2021-02-10

## 2021-02-06 RX ORDER — INSULIN LISPRO 100/ML
VIAL (ML) SUBCUTANEOUS
Refills: 0 | Status: DISCONTINUED | OUTPATIENT
Start: 2021-02-06 | End: 2021-02-06

## 2021-02-06 RX ADMIN — SODIUM CHLORIDE 75 MILLILITER(S): 9 INJECTION INTRAMUSCULAR; INTRAVENOUS; SUBCUTANEOUS at 01:22

## 2021-02-06 RX ADMIN — APIXABAN 5 MILLIGRAM(S): 2.5 TABLET, FILM COATED ORAL at 06:01

## 2021-02-06 RX ADMIN — APIXABAN 2.5 MILLIGRAM(S): 2.5 TABLET, FILM COATED ORAL at 17:22

## 2021-02-06 RX ADMIN — POLYETHYLENE GLYCOL 3350 17 GRAM(S): 17 POWDER, FOR SOLUTION ORAL at 10:39

## 2021-02-06 RX ADMIN — SENNA PLUS 2 TABLET(S): 8.6 TABLET ORAL at 21:03

## 2021-02-06 RX ADMIN — AMLODIPINE BESYLATE 5 MILLIGRAM(S): 2.5 TABLET ORAL at 06:01

## 2021-02-06 RX ADMIN — Medication 25 MILLIGRAM(S): at 06:01

## 2021-02-06 RX ADMIN — Medication 100 MICROGRAM(S): at 06:01

## 2021-02-06 NOTE — H&P ADULT - PROBLEM SELECTOR PLAN 2
Na 130 on admission, hyponatremia likely secondary decreased PO intake in setting of COVID   - Continue with IVF NS @ 75 cc/hr for 12 hours   - Avoid rapid overcorrection of Na; do not exceed 10-12 meq in 24 hours  - Nephro (Dr Choudhary) consulted; f/u recs

## 2021-02-06 NOTE — H&P ADULT - PROBLEM SELECTOR PLAN 3
Patient complaining of weakness and frequent falls since COVID diagnosis. Wife states that she has been assisting him and that he is too weak to get up out of bed. Fell yesterday but did not hit head or lose consciousness; fall was mechanical in nature, patient hit his knees. Weakness likely related to decreased PO intake in setting of COVID   - CT head negative for acute pathology   - Fall risk protocol   - PT consulted; f/u recs Patient complaining of weakness and frequent falls since COVID diagnosis. Wife states that she has been assisting him and that he is too weak to get up out of bed. Fell yesterday but did not hit head or lose consciousness; fall was mechanical in nature, patient hit his knees. Weakness likely related to decreased PO intake in setting of COVID   - CT head negative for acute pathology   - Fall risk protocol  - Ambulate with assistance   - PT consulted; f/u recs

## 2021-02-06 NOTE — H&P ADULT - PROBLEM SELECTOR PLAN 5
Chronic, stable   - Currently rate controlled   - Continue metoprolol succinate 25 mg daily with hold parameters   - Continue Eliquis 5 mg BID Chronic, stable   - Currently rate controlled   - Continue metoprolol succinate 25 mg daily with hold parameters   - Continue Eliquis 5 mg BID (may need to adjust w/ renal function)

## 2021-02-06 NOTE — H&P ADULT - NSHPPHYSICALEXAM_GEN_ALL_CORE
T(C): 37 (02-06-21 @ 00:57), Max: 37.2 (02-05-21 @ 14:37)  HR: 82 (02-06-21 @ 00:57) (79 - 98)  BP: 134/81 (02-06-21 @ 00:57) (90/71 - 134/81)  RR: 18 (02-06-21 @ 00:57) (16 - 20)  SpO2: 92% (02-06-21 @ 00:57) (92% - 98%)    GENERAL: patient appears well, no acute distress  ENMT: oropharynx clear without erythema, no exudates, dry mucous membranes  NECK: supple, soft, no thyromegaly noted  LUNGS: good air entry bilaterally, clear to auscultation, symmetric breath sounds, no wheezing or rhonchi appreciated  HEART: soft S1/S2, regular rate and rhythm, no murmurs noted, no lower extremity edema  GASTROINTESTINAL: abdomen is soft, nontender, nondistended, normoactive bowel sounds, no palpable masses  INTEGUMENT: good skin turgor, warm skin, appears well perfused  MUSCULOSKELETAL: no clubbing or cyanosis, no obvious deformity  NEUROLOGIC: awake, alert, oriented x3, good muscle tone in 4 extremities, no obvious sensory deficits

## 2021-02-06 NOTE — H&P ADULT - NSHPSOCIALHISTORY_GEN_ALL_CORE
Patient lives at home with wife.   Ambulates and performs all ADLs independently.   Denies ETOH, smoking, or recreational drug use

## 2021-02-06 NOTE — H&P ADULT - NSHPREVIEWOFSYSTEMS_GEN_ALL_CORE
CONSTITUTIONAL: denies fever, chills, admits fatigue and weakness  HEENT: denies blurred vision, sore throat  SKIN: denies new lesions, rash  CARDIOVASCULAR: denies chest pain, chest pressure, palpitations  RESPIRATORY: denies shortness of breath, sputum production  GASTROINTESTINAL: denies nausea, vomiting, diarrhea, abdominal pain, admits to constipation   GENITOURINARY: denies dysuria, discharge  NEUROLOGICAL: denies numbness, headache  MUSCULOSKELETAL: denies new joint pain, muscle aches  HEMATOLOGIC: denies gross bleeding, bruising  LYMPHATICS: denies enlarged lymph nodes, extremity swelling

## 2021-02-06 NOTE — H&P ADULT - PROBLEM SELECTOR PLAN 7
Chronic, stable   - Continue amlodipine 5 mg daily with hold parameters   - Hold home dose of telmisartan-HCTZ given ELIEZER Chronic, stable   - Continue amlodipine 5 mg daily with hold parameters  - continue toprol xl 25mg daily   - Hold home dose of telmisartan-HCTZ given ELIEZER  - monitor hemodynamics

## 2021-02-06 NOTE — H&P ADULT - PROBLEM SELECTOR PLAN 4
Patient diagnosed with COVID 2 weeks ago on 1/22. He is not hypoxic and is satting well on room air   -  No need for decadron given patient is not hypoxic.  - Out of window for remdesivir   - monitor volume status closely, avoid aggressive hydration  -  CBC with diff and CMP QD. Ferritin, crp, d-dimer, procal at admission then will repeat If clinically worsening every 48-72 hours.  - VTE ppx with Eliquis. Patient diagnosed with COVID 2 weeks ago on 1/22. He is not hypoxic and is satting well on room air. Has patchy infiltrates on PNA c/w COVID-19 PNA.  -  No need for decadron given patient is not hypoxic.  - Out of window for remdesivir   - monitor volume status closely, avoid aggressive hydration  -  CBC with diff and CMP QD. Ferritin, crp, d-dimer, procal at admission then will repeat If clinically worsening every 48-72 hours.  - VTE ppx with Eliquis.

## 2021-02-06 NOTE — H&P ADULT - PROBLEM SELECTOR PLAN 9
VTE ppx: Continue Eliquis 5 mg BID   IMPROVE VTE Individual Risk Assessment  RISK                                                                Points  [  ] Previous VTE                                                  3  [  ] Thrombophilia                                               2  [  ] Lower limb paralysis                                      2        (unable to hold up >15 seconds)    [  ] Current Cancer                                              2         (within 6 months)  [  ] Immobilization > 24 hrs                                1  [  ] ICU/CCU stay > 24 hours                              1  [ x ] Age > 60                                                      1  IMPROVE VTE Score: 1

## 2021-02-06 NOTE — H&P ADULT - NSICDXPASTMEDICALHX_GEN_ALL_CORE_FT
PAST MEDICAL HISTORY:  Bulging disc cervical spine 10 years ago - deneies any radicular pain    Depression 5 years    Erectile dysfunction takes viagra prn    Hemorrhoids without mention of complications     HTN (hypertension) 10 years    Hypothyroidism last tft's - 1 month ago    IBS (irritable bowel syndrome)     Osteoarthritis     Polycystic kidney vague history unsure as to diagnosis, states "functioning normal "

## 2021-02-06 NOTE — PHARMACOTHERAPY INTERVENTION NOTE - COMMENTS
Patient currently ordered Eliquis 5 mg BID for Afib, continuation of outpatient regimen. eGFR today is 27 mL/min, DOACs not extensively studied in eGFR <30. Discussed with Dr. Daria Early MD aware of renal function would like to continue with Eliquis at reduced dose of 2.5 mg BID.

## 2021-02-06 NOTE — H&P ADULT - PROBLEM SELECTOR PLAN 1
Patient with h/o polycystic kidney disease (unknown baseline Cr), recently diagnosed with COVID and not eating/drinking well for the past two weeks. ELIEZER likely 2/2 decreased PO intake in setting of COVID infection   -Baseline creatinine unknown, he does have a h/o PCKD and follows with a nephrologist but cannot recall name. Would consider calling PMD to obtain baseline kidney function.   -Avoid nephrotoxic meds, hold telmisartan-HCTZ.   -gentle hydration with NS @ 75 cc/hr for 12 hours.   - repeat BMP in AM  - Nephro (Dr Choudhary) consulted; f/u recs

## 2021-02-06 NOTE — H&P ADULT - ASSESSMENT
Patient is a 75 year old male with PMH of afib on Eliquis, polycystic kidney diease, PPM (placed in 2019), HTN, SHRUTHI (not on CPAP) transferred from [facility] to [facility] for admission for weakness, frequent falls, and ELIEZER in setting of COVID19

## 2021-02-06 NOTE — H&P ADULT - NSHPOUTPATIENTPROVIDERS_GEN_ALL_CORE
PMD: Dr Rendon   Cardiologist: Dr Montez  Nephrologist: patient nor wife  does not recall PMD: Dr Rendon   Cardiologist: Dr Montez  Nephrologist: does not recall

## 2021-02-06 NOTE — H&P ADULT - NSICDXPASTSURGICALHX_GEN_ALL_CORE_FT
PAST SURGICAL HISTORY:  Hernia NEC repair approximately 10 years ago    History of appendectomy 1990's    Lipoma multiple  times - last 10 years from neck and thigh in past    Ulnar nerve entrapment right wrist decompression - 2000

## 2021-02-06 NOTE — H&P ADULT - HISTORY OF PRESENT ILLNESS
Patient is a 75 year old male with PMH of afib on Eliquis, polycystic kidney diease, PPM, HTN, DM, SHRUTHI who presents with             In the ER:   VS: afebrile, HR: 88, BP: 130/80, RR: 16, SpO2: 95% on room air   Labs were significant for Na 130, K: 4.3, BUN/Cr: 64/2.14 serum glucose: 114   EKG:   CXR: Few patchy infiltrates.  CT Head: no acute intracranial pathology   Patient received 2 L LR bolus    Patient is a 75 year old male with PMH of afib on Eliquis, polycystic kidney diease, PPM (placed in 2019), HTN, SHRUTHI (not on CPAP) transferred from [facility] to [facility] for admission for weakness, frequent falls, and ELIEZER in setting of COVID19. History obtained from patient and wife Yamileth over the phone. Per patient and wife, patient was diagnosed with COVID 2 weeks ago (not sure of exact date). Since then, per wife, patient has been extremely weak and has not been eating or drinking properly. He has also been so weak that he was requiring assistance to walk and go to the bathroom. He had no fevers, chills, anosmia, SOB, cough, chest pain, or palpitations. WIfe states that yesterday morning, she brought patient to the bathroom and his "legs gave out " and he fell to the ground on his knees. He did not hit his head at the time. Denies LOC. She became concerned and called EMS to bring him to the hospital. Patient deniies any dizziness, lighheadedness, or palpitations when he fell. He admits to dizziness spells in the past that he had his PPM placed for. He is normally independently functional and walks without assistance. Denies any other acute complaints.     In the ER at [facility]:     VS: afebrile, HR: 88, BP: 130/80, RR: 16, SpO2: 95% on room air   Labs were significant for Na 130, K: 4.3, BUN/Cr: 64/2.14 serum glucose: 114   EKG: paced, atrial flutter, VR 91 bpm   CXR: Few patchy infiltrates.  CT Head: no acute intracranial pathology     Patient received 2 L LR bolus    Patient is a 75 year old male with PMH of afib on Eliquis, polycystic kidney diease, PPM (placed in 2019), HTN, SHRUTHI (not on CPAP) transferred from [facility] to [facility] for admission for weakness, frequent falls, and ELIEZER in setting of COVID19. History obtained from patient and wife Yamileth over the phone. Per patient and wife, patient was diagnosed with COVID 2 weeks ago (not sure of exact date). Since then, per wife, patient has been extremely weak and has not been eating or drinking properly. He has also been so weak that he was requiring assistance to walk and go to the bathroom. He had no fevers, chills, anosmia, SOB, cough, chest pain, or palpitations. WIfe states that yesterday morning, she brought patient to the bathroom and his "legs gave out " and he fell to the ground on his knees. He did not hit his head at the time. Denies LOC. She became concerned and called EMS to bring him to the hospital. Patient deniies any dizziness, lighheadedness, or palpitations when he fell. He admits to dizziness spells in the past that he had his PPM placed for. He is normally independently functional and walks without assistance. Denies any other acute complaints.     In the ER at [facility]:     VS: afebrile, HR: 88, BP: 130/80, RR: 16, SpO2: 95% on room air   Labs were significant for Na 130, K: 4.3, BUN/Cr: 64/2.14 serum glucose: 114   EKG: paced, atrial flutter, VR 91 bpm  CXR: Few patchy infiltrates.  CT Head: no acute intracranial pathology     Patient received 2 L LR bolus

## 2021-02-06 NOTE — PROGRESS NOTE ADULT - ASSESSMENT
· Assessment	  Patient is a 75 year old male with PMH of afib on Eliquis, polycystic kidney diease, PPM (placed in 2019), HTN, SHRUTHI (not on CPAP) transferred from [facility] to [facility] for admission for weakness, frequent falls, and ELIEZER in setting of COVID19     Problem/Plan - 1: ELIEZER (acute kidney injury).   Patient with h/o polycystic kidney disease (unknown baseline Cr), recently diagnosed with COVID and not eating/drinking well for the past two weeks. ELIEZER likely 2/2 decreased PO intake in setting of COVID infection   -Baseline creatinine unknown, he does have a h/o PCKD and follows with a nephrologist   -Avoid nephrotoxic meds, hold telmisartan-HCTZ.   -gentle hydration with NS @ 75 cc/hr for 12 hours.   - repeat BMP in AM  - Nephro (Dr Choudhary) consulted; f/u recs.      2: Hyponatremia.     Na 130 on admission, hyponatremia likely secondary decreased PO intake in setting of COVID   - Continue with IVF NS @ 75 cc/hr for 12 hours   - Nephro (Dr Choudhary) consulted; f/u recs.     3: Weakness.     Patient complaining of weakness and frequent falls since COVID diagnosis.    Fell yesterday but did not hit head or lose consciousness   Weakness likely related to decreased PO intake in setting of COVID   - CT head negative for acute pathology   - Fall risk protocol  - Ambulate with assistance   - PT consulted; f/u recs.     4:(COVID-19).    Patient diagnosed with COVID 2 weeks ago on 1/22.   He is not hypoxic and is satting well on room air. Has patchy infiltrates on PNA c/w COVID-19 PNA.  -  No need for decadron given patient is not hypoxic.  - Out of window for remdesivir   - monitor volume status closely, avoid aggressive hydration  -  CBC with diff and CMP QD. Ferritin, crp, d-dimer, procal at admission then will repeat If clinically worsening every 48-72 hours.  - VTE ppx with Eliquis.     5:Atrial fibrillation.    - Chronic, stable   - Currently rate controlled   - Continue metoprolol succinate 25 mg daily with hold parameters   - Continue Eliquis, renally adjusted at 2.5 mg BID .    - 6: Hypothyroidism.   Chronic, stable  - Continue Synthroid 100 mcg daily   - F/u TSH in AM.     7: HTN (hypertension).    stable   - Continue amlodipine 5 mg daily with hold parameters  - continue toprol xl 25mg daily   - Hold home dose of telmisartan-HCTZ given ELIEZER  - monitor hemodynamics.     8: Constipation.    Patient complaining of constipation for the past few days   - Start senna and Miralax daily.      VTE ppx: Continue Eliquis 5 mg BID   IMPROVE VTE Individual Risk Assessment  RISK                                                                Points  [  ] Previous VTE                                                  3  [  ] Thrombophilia                                               2  [  ] Lower limb paralysis                                      2        (unable to hold up >15 seconds)    [  ] Current Cancer                                              2         (within 6 months)  [  ] Immobilization > 24 hrs                                1  [  ] ICU/CCU stay > 24 hours                              1  [ x ] Age > 60                                                      1  IMPROVE VTE Score: 1.     spoked with wife Yamileth @ [phone] and gave her an update about patient's condition

## 2021-02-07 LAB
ALBUMIN SERPL ELPH-MCNC: 2.1 G/DL — LOW (ref 3.3–5)
ALP SERPL-CCNC: 67 U/L — SIGNIFICANT CHANGE UP (ref 40–120)
ALT FLD-CCNC: 19 U/L — SIGNIFICANT CHANGE UP (ref 12–78)
ANION GAP SERPL CALC-SCNC: 9 MMOL/L — SIGNIFICANT CHANGE UP (ref 5–17)
AST SERPL-CCNC: 29 U/L — SIGNIFICANT CHANGE UP (ref 15–37)
BILIRUB SERPL-MCNC: 0.7 MG/DL — SIGNIFICANT CHANGE UP (ref 0.2–1.2)
BUN SERPL-MCNC: 50 MG/DL — HIGH (ref 7–23)
CALCIUM SERPL-MCNC: 8.6 MG/DL — SIGNIFICANT CHANGE UP (ref 8.5–10.1)
CHLORIDE SERPL-SCNC: 103 MMOL/L — SIGNIFICANT CHANGE UP (ref 96–108)
CO2 SERPL-SCNC: 27 MMOL/L — SIGNIFICANT CHANGE UP (ref 22–31)
CREAT SERPL-MCNC: 1.9 MG/DL — HIGH (ref 0.5–1.3)
GLUCOSE SERPL-MCNC: 96 MG/DL — SIGNIFICANT CHANGE UP (ref 70–99)
HCT VFR BLD CALC: 33.9 % — LOW (ref 39–50)
HCV AB S/CO SERPL IA: 0.21 S/CO — SIGNIFICANT CHANGE UP (ref 0–0.99)
HCV AB SERPL-IMP: SIGNIFICANT CHANGE UP
HGB BLD-MCNC: 11.7 G/DL — LOW (ref 13–17)
MCHC RBC-ENTMCNC: 30.8 PG — SIGNIFICANT CHANGE UP (ref 27–34)
MCHC RBC-ENTMCNC: 34.5 GM/DL — SIGNIFICANT CHANGE UP (ref 32–36)
MCV RBC AUTO: 89.2 FL — SIGNIFICANT CHANGE UP (ref 80–100)
NRBC # BLD: 0 /100 WBCS — SIGNIFICANT CHANGE UP (ref 0–0)
PLATELET # BLD AUTO: 287 K/UL — SIGNIFICANT CHANGE UP (ref 150–400)
POTASSIUM SERPL-MCNC: 3 MMOL/L — LOW (ref 3.5–5.3)
POTASSIUM SERPL-SCNC: 3 MMOL/L — LOW (ref 3.5–5.3)
PROT SERPL-MCNC: 6.3 G/DL — SIGNIFICANT CHANGE UP (ref 6–8.3)
RBC # BLD: 3.8 M/UL — LOW (ref 4.2–5.8)
RBC # FLD: 13.2 % — SIGNIFICANT CHANGE UP (ref 10.3–14.5)
SODIUM SERPL-SCNC: 139 MMOL/L — SIGNIFICANT CHANGE UP (ref 135–145)
WBC # BLD: 4 K/UL — SIGNIFICANT CHANGE UP (ref 3.8–10.5)
WBC # FLD AUTO: 4 K/UL — SIGNIFICANT CHANGE UP (ref 3.8–10.5)

## 2021-02-07 PROCEDURE — 99233 SBSQ HOSP IP/OBS HIGH 50: CPT

## 2021-02-07 RX ORDER — APIXABAN 2.5 MG/1
5 TABLET, FILM COATED ORAL EVERY 12 HOURS
Refills: 0 | Status: DISCONTINUED | OUTPATIENT
Start: 2021-02-07 | End: 2021-02-10

## 2021-02-07 RX ORDER — LEVOTHYROXINE SODIUM 125 MCG
88 TABLET ORAL DAILY
Refills: 0 | Status: DISCONTINUED | OUTPATIENT
Start: 2021-02-07 | End: 2021-02-10

## 2021-02-07 RX ORDER — POTASSIUM CHLORIDE 20 MEQ
40 PACKET (EA) ORAL ONCE
Refills: 0 | Status: COMPLETED | OUTPATIENT
Start: 2021-02-07 | End: 2021-02-07

## 2021-02-07 RX ADMIN — APIXABAN 2.5 MILLIGRAM(S): 2.5 TABLET, FILM COATED ORAL at 06:41

## 2021-02-07 RX ADMIN — Medication 100 MICROGRAM(S): at 06:41

## 2021-02-07 RX ADMIN — SENNA PLUS 2 TABLET(S): 8.6 TABLET ORAL at 22:30

## 2021-02-07 RX ADMIN — Medication 25 MILLIGRAM(S): at 06:41

## 2021-02-07 RX ADMIN — APIXABAN 5 MILLIGRAM(S): 2.5 TABLET, FILM COATED ORAL at 17:05

## 2021-02-07 RX ADMIN — Medication 40 MILLIEQUIVALENT(S): at 17:05

## 2021-02-07 RX ADMIN — AMLODIPINE BESYLATE 5 MILLIGRAM(S): 2.5 TABLET ORAL at 06:41

## 2021-02-07 RX ADMIN — POLYETHYLENE GLYCOL 3350 17 GRAM(S): 17 POWDER, FOR SOLUTION ORAL at 11:13

## 2021-02-07 NOTE — PHYSICAL THERAPY INITIAL EVALUATION ADULT - ADDITIONAL COMMENTS
Pt lives with his spouse in a house, no steps. Pt was independent without device prior and independent with ADLs

## 2021-02-07 NOTE — PHARMACOTHERAPY INTERVENTION NOTE - COMMENTS
only meets 1 of 3 dosing criteria for dose reduction from 5mg to 2.5mg (scr 1.9).  Changed to 5mg q12h per telephone order from Dr. MARQUIS Early.

## 2021-02-07 NOTE — PHYSICAL THERAPY INITIAL EVALUATION ADULT - PERTINENT HX OF CURRENT PROBLEM, REHAB EVAL
74 y/o male adm 2/6 with weakness, COVID 19. CT head: negative. U/S kidney/bladder: + polycystic kidney disease

## 2021-02-07 NOTE — PHYSICAL THERAPY INITIAL EVALUATION ADULT - FOLLOWS COMMANDS/ANSWERS QUESTIONS, REHAB EVAL
Pt has read message with no response. Will close.    Jordyn Lockett CMA ([facility])     able to follow multistep instructions

## 2021-02-07 NOTE — PROGRESS NOTE ADULT - ASSESSMENT
· Assessment	  Patient is a 75 year old male with PMH of afib on Eliquis, polycystic kidney diease, PPM (placed in 2019), HTN, SHRUTHI (not on CPAP) transferred from [facility] to [facility] for admission for weakness, frequent falls, and ELIEZER in setting of COVID19     1: ELIEZER (acute kidney injury).   -Cr 2.74-->2.14-->2.30-->1.90  -Baseline creatinine unknown, he does have a h/o PCKD and follows with a nephrologist   -Avoid nephrotoxic meds, hold telmisartan-HCTZ.   -gentle hydration with NS completed   - monitor BMP   - Nephro (Dr Choudhary) consulted; f/u recs.      2: Hyponatremia.     - Na 130 on admission, hyponatremia likely secondary decreased PO intake in setting of COVID   - Completed IVF NS   - Nephro (Dr Choudhary) consulted; f/u recs.   - F/up BMP    3: Weakness.     Patient complaining of weakness and frequent falls since COVID diagnosis.   - CT head negative for acute pathology   - Fall risk protocol  - Ambulate with assistance   - PT consulted; f/u recs.     4:COVID-19    Patient diagnosed with COVID 2 weeks ago on 1/22.   -He is not hypoxic , saturation is good on room air.    -CXR 02/05/21: patchy infiltrates on PNA c/w COVID-19 PNA, f/up CXR in am  -  No need for decadron given patient is not hypoxic.  - Out of window for remdesivir   - monitor volume status closely, avoid aggressive hydration  -  CBC with diff and CMP QD. Ferritin, crp, d-dimer, procal at admission then will repeat If clinically worsening every 48-72 hours.  - VTE ppx with Eliquis, renally dose     5: Atrial fibrillation.    - Chronic, stable   - Currently rate controlled   - Continue metoprolol succinate 25 mg daily with hold parameters   - Continue Eliquis, renally adjusted     - 6: Hypothyroidism.   Chronic, stable  - TSH 0.39, borderline low, will decrease  Synthroid to 75 mcg daily   - F/up TSH in 6-8 weeks     7: HTN (hypertension).    stable   - Continue amlodipine 5 mg daily with hold parameters  - continue toprol xl 25mg daily   - Hold home dose of telmisartan-HCTZ given ELIEZER  - monitor hemodynamics.     8: Constipation.    Patient complaining of constipation for the past few days   - Started senna and Miralax daily.      VTE ppx: Continue Eliquis 5 mg BID   IMPROVE VTE Individual Risk Assessment  RISK                                                                Points  [  ] Previous VTE                                                  3  [  ] Thrombophilia                                               2  [  ] Lower limb paralysis                                      2        (unable to hold up >15 seconds)    [  ] Current Cancer                                              2         (within 6 months)  [  ] Immobilization > 24 hrs                                1  [  ] ICU/CCU stay > 24 hours                              1  [ x ] Age > 60                                                      1  IMPROVE VTE Score: 1.     spoked with wife Yamileth @ [phone] and gave her an update about patient's condition · Assessment	  Patient is a 75 year old male with PMH of afib on Eliquis, polycystic kidney diease, PPM (placed in 2019), HTN, SHRUTHI (not on CPAP) transferred from [facility] to [facility] for admission for weakness, frequent falls, and ELIEZER in setting of COVID19     1: ELIEZER (acute kidney injury).   -Cr 2.74-->2.14-->2.30-->1.90  -Baseline creatinine unknown, he does have a h/o PCKD and follows with a nephrologist   -Avoid nephrotoxic meds, hold telmisartan-HCTZ.   -gentle hydration with NS completed   - monitor BMP   - Nephro (Dr Choudhary) consulted; f/u recs.      2: Hyponatremia.     - Na 130 on admission, hyponatremia likely secondary decreased PO intake in setting of COVID   - Completed IVF NS   - Nephro (Dr Choudhary) consulted; f/u recs.   - F/up BMP    3: Weakness.     Patient complaining of weakness and frequent falls since COVID diagnosis.   - CT head negative for acute pathology   - Fall risk protocol  - Ambulate with assistance   - PT consulted; f/u recs.     4:COVID-19    Patient diagnosed with COVID 2 weeks ago on 1/22.   -He is not hypoxic , saturation is good on room air.    -CXR 02/05/21: patchy infiltrates on PNA c/w COVID-19 PNA, f/up CXR in am  -  No need for decadron given patient is not hypoxic.  - Out of window for remdesivir   - monitor volume status closely, avoid aggressive hydration  -  CBC with diff and CMP QD. Ferritin, crp, d-dimer, procal at admission then will repeat If clinically worsening every 48-72 hours.  - VTE ppx with Eliquis, renally dose     5: Atrial fibrillation.    - Chronic, stable   - Currently rate controlled   - Continue metoprolol succinate 25 mg daily with hold parameters   - Continue Eliquis, renally adjusted     - 6: Hypothyroidism.   Chronic, stable  - TSH 0.39, borderline low, will decrease  Synthroid to 88 mcg daily   - F/up TSH in 6-8 weeks     7: HTN (hypertension).    stable   - Continue amlodipine 5 mg daily with hold parameters  - continue toprol xl 25mg daily   - Hold home dose of telmisartan-HCTZ given ELIEZER  - monitor hemodynamics.     8: Constipation.    Patient complaining of constipation for the past few days   - Started senna and Miralax daily.      VTE ppx: Continue Eliquis 5 mg BID   IMPROVE VTE Individual Risk Assessment  RISK                                                                Points  [  ] Previous VTE                                                  3  [  ] Thrombophilia                                               2  [  ] Lower limb paralysis                                      2        (unable to hold up >15 seconds)    [  ] Current Cancer                                              2         (within 6 months)  [  ] Immobilization > 24 hrs                                1  [  ] ICU/CCU stay > 24 hours                              1  [ x ] Age > 60                                                      1  IMPROVE VTE Score: 1.     spoked with wife Yamileth @ [phone] and gave her an update about patient's condition

## 2021-02-08 LAB
ALBUMIN SERPL ELPH-MCNC: 2 G/DL — LOW (ref 3.3–5)
ALP SERPL-CCNC: 66 U/L — SIGNIFICANT CHANGE UP (ref 40–120)
ALT FLD-CCNC: 18 U/L — SIGNIFICANT CHANGE UP (ref 12–78)
ANION GAP SERPL CALC-SCNC: 8 MMOL/L — SIGNIFICANT CHANGE UP (ref 5–17)
AST SERPL-CCNC: 25 U/L — SIGNIFICANT CHANGE UP (ref 15–37)
BILIRUB SERPL-MCNC: 0.5 MG/DL — SIGNIFICANT CHANGE UP (ref 0.2–1.2)
BUN SERPL-MCNC: 39 MG/DL — HIGH (ref 7–23)
CALCIUM SERPL-MCNC: 8.5 MG/DL — SIGNIFICANT CHANGE UP (ref 8.5–10.1)
CHLORIDE SERPL-SCNC: 106 MMOL/L — SIGNIFICANT CHANGE UP (ref 96–108)
CO2 SERPL-SCNC: 28 MMOL/L — SIGNIFICANT CHANGE UP (ref 22–31)
CREAT SERPL-MCNC: 1.7 MG/DL — HIGH (ref 0.5–1.3)
GLUCOSE SERPL-MCNC: 92 MG/DL — SIGNIFICANT CHANGE UP (ref 70–99)
HCT VFR BLD CALC: 32.5 % — LOW (ref 39–50)
HGB BLD-MCNC: 11.1 G/DL — LOW (ref 13–17)
MCHC RBC-ENTMCNC: 30.6 PG — SIGNIFICANT CHANGE UP (ref 27–34)
MCHC RBC-ENTMCNC: 34.2 GM/DL — SIGNIFICANT CHANGE UP (ref 32–36)
MCV RBC AUTO: 89.5 FL — SIGNIFICANT CHANGE UP (ref 80–100)
NRBC # BLD: 0 /100 WBCS — SIGNIFICANT CHANGE UP (ref 0–0)
PLATELET # BLD AUTO: 336 K/UL — SIGNIFICANT CHANGE UP (ref 150–400)
POTASSIUM SERPL-MCNC: 3.4 MMOL/L — LOW (ref 3.5–5.3)
POTASSIUM SERPL-SCNC: 3.4 MMOL/L — LOW (ref 3.5–5.3)
PROT SERPL-MCNC: 6 G/DL — SIGNIFICANT CHANGE UP (ref 6–8.3)
RBC # BLD: 3.63 M/UL — LOW (ref 4.2–5.8)
RBC # FLD: 13.3 % — SIGNIFICANT CHANGE UP (ref 10.3–14.5)
SODIUM SERPL-SCNC: 142 MMOL/L — SIGNIFICANT CHANGE UP (ref 135–145)
WBC # BLD: 2.83 K/UL — LOW (ref 3.8–10.5)
WBC # FLD AUTO: 2.83 K/UL — LOW (ref 3.8–10.5)

## 2021-02-08 PROCEDURE — 99233 SBSQ HOSP IP/OBS HIGH 50: CPT

## 2021-02-08 PROCEDURE — 71045 X-RAY EXAM CHEST 1 VIEW: CPT | Mod: 26

## 2021-02-08 RX ORDER — POTASSIUM CHLORIDE 20 MEQ
40 PACKET (EA) ORAL ONCE
Refills: 0 | Status: COMPLETED | OUTPATIENT
Start: 2021-02-08 | End: 2021-02-08

## 2021-02-08 RX ORDER — MAGNESIUM HYDROXIDE 400 MG/1
30 TABLET, CHEWABLE ORAL DAILY
Refills: 0 | Status: DISCONTINUED | OUTPATIENT
Start: 2021-02-08 | End: 2021-02-10

## 2021-02-08 RX ADMIN — AMLODIPINE BESYLATE 5 MILLIGRAM(S): 2.5 TABLET ORAL at 05:50

## 2021-02-08 RX ADMIN — APIXABAN 5 MILLIGRAM(S): 2.5 TABLET, FILM COATED ORAL at 05:45

## 2021-02-08 RX ADMIN — SENNA PLUS 2 TABLET(S): 8.6 TABLET ORAL at 22:43

## 2021-02-08 RX ADMIN — POLYETHYLENE GLYCOL 3350 17 GRAM(S): 17 POWDER, FOR SOLUTION ORAL at 10:05

## 2021-02-08 RX ADMIN — Medication 88 MICROGRAM(S): at 05:45

## 2021-02-08 RX ADMIN — Medication 40 MILLIEQUIVALENT(S): at 11:50

## 2021-02-08 RX ADMIN — Medication 25 MILLIGRAM(S): at 05:40

## 2021-02-08 RX ADMIN — APIXABAN 5 MILLIGRAM(S): 2.5 TABLET, FILM COATED ORAL at 16:17

## 2021-02-08 NOTE — PROGRESS NOTE ADULT - ASSESSMENT
· Assessment	  Patient is a 75 year old male with PMH of afib on Eliquis, polycystic kidney diease, PPM (placed in 2019), HTN, SHRUTHI (not on CPAP) transferred from [facility] to [facility] for admission for weakness, frequent falls, and ELIEZER in setting of COVID19     1- ELIEZER (acute kidney injury).   -Cr 2.74-->2.14-->2.30-->1.90--.1.70  -Baseline creatinine unknown, he does have a h/o PCKD and follows with a nephrologist   -Avoid nephrotoxic meds, hold telmisartan-HCTZ.   -gentle hydration with NS completed   - monitor BMP   - Nephro (Dr Choudhary) consulted; f/u recs.      2- Hyponatremia.     - Na 130 on admission, hyponatremia likely secondary decreased PO intake in setting of COVID   - Completed IVF NS   - Nephro (Dr Choudhary) consulted; f/u recs.   - F/up BMP-->142 Na    3- Weakness.     Patient complaining of weakness and frequent falls since COVID diagnosis.   - CT head negative for acute pathology   - Fall risk protocol  - Ambulate with assistance   - PT consulted; f/u recs--home care, no skilled PT needs    4- COVID-19    Patient diagnosed with COVID 2 weeks ago on 1/22.   -He remains no hypoxic , saturation is good on room air.    -CXR 02/05/21: patchy infiltrates on PNA c/w COVID-19 PNA, f/up CXR 02/8/21: bilateral small patchy infiltrate and there is no evidence of pneumothorax nor pleural effusion  -  No need for decadron given patient is not hypoxic.  - Out of window for remdesivir   - monitor volume status closely, avoid aggressive hydration  -  CBC with diff and CMP QD. Ferritin, crp, d-dimer, procal at admission then will repeat If clinically worsening every 48-72 hours.  - VTE ppx with Eliquis, renally dose  -O2 qualification ordered     5: Atrial fibrillation.    - Chronic, stable   - Currently rate controlled   - Continue metoprolol succinate 25 mg daily with hold parameters   - Continue Eliquis, renally adjusted     - 6: Hypothyroidism.   Chronic, stable  - TSH 0.39, borderline low, will decrease  Synthroid to 88 mcg daily   - F/up TSH in 6-8 weeks     7: HTN (hypertension).    stable   - Continue amlodipine 5 mg daily with holding parameters  - continue toprol xl 25mg daily   - Hold home dose of telmisartan-HCTZ given ELIEZER  - monitor hemodynamics.     8: Constipation.    Patient complaining of constipation for the past few days   - Started senna and Miralax daily.      VTE ppx: Continue Eliquis 5 mg BID   IMPROVE VTE Individual Risk Assessment  RISK                                                                Points  [  ] Previous VTE                                                  3  [  ] Thrombophilia                                               2  [  ] Lower limb paralysis                                      2        (unable to hold up >15 seconds)    [  ] Current Cancer                                              2         (within 6 months)  [  ] Immobilization > 24 hrs                                1  [  ] ICU/CCU stay > 24 hours                              1  [ x ] Age > 60                                                      1  IMPROVE VTE Score: 1.     spoked with wife Yamileth @ [phone] and gave her an update about patient's condition

## 2021-02-09 ENCOUNTER — TRANSCRIPTION ENCOUNTER (OUTPATIENT)
Age: 76
End: 2021-02-09

## 2021-02-09 LAB
ANION GAP SERPL CALC-SCNC: 6 MMOL/L — SIGNIFICANT CHANGE UP (ref 5–17)
BUN SERPL-MCNC: 35 MG/DL — HIGH (ref 7–23)
CALCIUM SERPL-MCNC: 9.1 MG/DL — SIGNIFICANT CHANGE UP (ref 8.5–10.1)
CHLORIDE SERPL-SCNC: 106 MMOL/L — SIGNIFICANT CHANGE UP (ref 96–108)
CO2 SERPL-SCNC: 29 MMOL/L — SIGNIFICANT CHANGE UP (ref 22–31)
CREAT SERPL-MCNC: 1.7 MG/DL — HIGH (ref 0.5–1.3)
GLUCOSE SERPL-MCNC: 94 MG/DL — SIGNIFICANT CHANGE UP (ref 70–99)
POTASSIUM SERPL-MCNC: 4.1 MMOL/L — SIGNIFICANT CHANGE UP (ref 3.5–5.3)
POTASSIUM SERPL-SCNC: 4.1 MMOL/L — SIGNIFICANT CHANGE UP (ref 3.5–5.3)
SARS-COV-2 IGG SERPL QL IA: POSITIVE
SARS-COV-2 IGM SERPL IA-ACNC: 21.7 AU/ML — HIGH
SODIUM SERPL-SCNC: 141 MMOL/L — SIGNIFICANT CHANGE UP (ref 135–145)

## 2021-02-09 PROCEDURE — 99233 SBSQ HOSP IP/OBS HIGH 50: CPT

## 2021-02-09 RX ORDER — POTASSIUM CHLORIDE 20 MEQ
40 PACKET (EA) ORAL ONCE
Refills: 0 | Status: COMPLETED | OUTPATIENT
Start: 2021-02-09 | End: 2021-02-09

## 2021-02-09 RX ORDER — SODIUM CHLORIDE 9 MG/ML
1000 INJECTION, SOLUTION INTRAVENOUS
Refills: 0 | Status: DISCONTINUED | OUTPATIENT
Start: 2021-02-09 | End: 2021-02-10

## 2021-02-09 RX ADMIN — SENNA PLUS 2 TABLET(S): 8.6 TABLET ORAL at 21:25

## 2021-02-09 RX ADMIN — APIXABAN 5 MILLIGRAM(S): 2.5 TABLET, FILM COATED ORAL at 04:53

## 2021-02-09 RX ADMIN — Medication 25 MILLIGRAM(S): at 04:53

## 2021-02-09 RX ADMIN — AMLODIPINE BESYLATE 5 MILLIGRAM(S): 2.5 TABLET ORAL at 04:53

## 2021-02-09 RX ADMIN — APIXABAN 5 MILLIGRAM(S): 2.5 TABLET, FILM COATED ORAL at 16:37

## 2021-02-09 RX ADMIN — SODIUM CHLORIDE 50 MILLILITER(S): 9 INJECTION, SOLUTION INTRAVENOUS at 12:24

## 2021-02-09 RX ADMIN — Medication 88 MICROGRAM(S): at 04:53

## 2021-02-09 RX ADMIN — Medication 40 MILLIEQUIVALENT(S): at 12:24

## 2021-02-09 NOTE — PROGRESS NOTE ADULT - ASSESSMENT
· Assessment	  Patient is a 75 year old male with PMH of afib on Eliquis, polycystic kidney diease, PPM (placed in 2019), HTN, SHRUTHI (not on CPAP) transferred from [facility] to [facility] for admission for weakness, frequent falls, and ELIEZER in setting of COVID19     1- ELIEZER (acute kidney injury).   -Cr 2.74-->2.14-->2.30-->1.90-->1.70-->1.70  -Baseline creatinine unknown, he does have a h/o PCKD and follows with a nephrologist   -Avoid nephrotoxic meds, holding  telmisartan-HCTZ.   -gentle hydration with NS for 12 hrs restarted ny neprhology  - monitor BMP   - Nephro (Dr Choudhary) consulted; f/u recs.   - ok to d/c tomorrow     2- Hyponatremia.     - Na 130 on admission, hyponatremia likely secondary decreased PO intake in setting of COVID   - Completed IVF NS   - Nephro (Dr Choudhary) consulted; f/u recs.   - F/up BMP-->142-->141 Na    3- Weakness.     Patient complaining of weakness and frequent falls since COVID diagnosis.   - CT head negative for acute pathology   - Fall risk protocol  - Ambulate with assistance   - PT consulted; f/u recs--home care, no skilled PT needs    4- COVID-19    Patient diagnosed with COVID 2 weeks ago on 1/22.   -He remains no hypoxic , saturation is good on room air.    -CXR 02/05/21: patchy infiltrates on PNA c/w COVID-19 PNA, f/up CXR 02/8/21: bilateral small patchy infiltrate and there is no evidence of pneumothorax nor pleural effusion  -  No need for decadron given patient is not hypoxic.  - Out of window for remdesivir   - monitor volume status closely, avoid aggressive hydration  -  CBC with diff and CMP QD. Ferritin, crp, d-dimer, procal at admission then will repeat If clinically worsening every 48-72 hours.  - VTE ppx with Eliquis, renally dose  -O2 qualification ordered     5: Atrial fibrillation.    - Chronic, stable   - Currently rate controlled   - Continue metoprolol succinate 25 mg daily with hold parameters   - Continue Eliquis, renally adjusted     - 6: Hypothyroidism.   Chronic, stable  - TSH 0.39, borderline low, will decrease  Synthroid to 88 mcg daily   - F/up TSH in 6-8 weeks     7: HTN (hypertension).    - stable   - Continue amlodipine 5 mg daily with holding parameters  - continue toprol xl 25mg daily   - Hold home dose of telmisartan-HCTZ given ELIEZER  - monitor hemodynamics.     8: Constipation.    Patient complaining of constipation for the past few days   - continue senna and Miralax daily.      VTE ppx: Continue Eliquis 5 mg BID   IMPROVE VTE Individual Risk Assessment  RISK                                                                Points  [  ] Previous VTE                                                  3  [  ] Thrombophilia                                               2  [  ] Lower limb paralysis                                      2        (unable to hold up >15 seconds)    [  ] Current Cancer                                              2         (within 6 months)  [  ] Immobilization > 24 hrs                                1  [  ] ICU/CCU stay > 24 hours                              1  [ x ] Age > 60                                                      1  IMPROVE VTE Score: 1.     spoked with wife Yamileth @ [phone] and gave her an update about patient's condition, told her will be d/c tomorrow

## 2021-02-09 NOTE — CONSULT NOTE ADULT - ASSESSMENT
ELIEZER on CKD 3, + PCKD: Prerenal azotemia, On ARB rx, ? COVID nephropathy  COVID +, Pneumonia  Hypertension  Hypokalemia    Improving renal indices. To continue current meds. Rx for COVID. Monitor BP trend. Titrate BP meds as needed. Salt restriction.   Potassium supps. Avoid nephrotoxic meds as possible. Will follow electrolytes and renal function trend.   Further recommendations pending clinical course. Thank you for the courtesy of this referral.

## 2021-02-09 NOTE — DISCHARGE NOTE PROVIDER - HOSPITAL COURSE
FROM ADMISSION H+P:   HPI:  Patient is a 75 year old male with PMH of afib on Eliquis, polycystic kidney diease, PPM (placed in 2019), HTN, SHRUTHI (not on CPAP) transferred from [facility] to [facility] for admission for weakness, frequent falls, and ELIEZER in setting of COVID19. History obtained from patient and wife Yamileth over the phone. Per patient and wife, patient was diagnosed with COVID 2 weeks ago (not sure of exact date). Since then, per wife, patient has been extremely weak and has not been eating or drinking properly. He has also been so weak that he was requiring assistance to walk and go to the bathroom. He had no fevers, chills, anosmia, SOB, cough, chest pain, or palpitations. WIfe states that yesterday morning, she brought patient to the bathroom and his "legs gave out " and he fell to the ground on his knees. He did not hit his head at the time. Denies LOC. She became concerned and called EMS to bring him to the hospital. Patient denies any dizziness, lightheadedness, or palpitations when he fell. He admits to dizziness spells in the past that he had his PPM placed for. He is normally independently functional and walks without assistance. Denies any other acute complaints.     In the ER at [facility]:     VS: afebrile, HR: 88, BP: 130/80, RR: 16, SpO2: 95% on room air   Labs were significant for Na 130, K: 4.3, BUN/Cr: 64/2.14 serum glucose: 114   EKG: paced, atrial flutter, VR 91 bpm  CXR: Few patchy infiltrates.  CT Head: no acute intracranial pathology     Patient received 2 L LR bolus  (06 Feb 2021 00:47)      ---  HOSPITAL COURSE:      1- ELIEZER (acute kidney injury).   -Cr 2.74-->2.14-->2.30-->1.90-->1.70-->1.70  -Baseline creatinine unknown, he does have a h/o CKD and follows with a nephrologist   -Avoid nephrotoxic meds,  telmisartan-HCTZ on hold  -treated with gentle hydration with NS  - Nephro (Dr Choudhary) consulted  - recommending f/up as OP with primary nephrology in 1-2 weeks     2- Hyponatremia.     - Na 130 on admission, hyponatremia likely secondary decreased PO intake in setting of COVID   - Completed IVF NS   - Nephro (Dr Choudhary) consulted  - F/up BMP-->142-->141 Na    3- Weakness.     Patient complaining of weakness and frequent falls since COVID diagnosis.   - CT head negative for acute pathology   - Fall risk protocol  - recommending ambulate with assistance   - PT consulted; f/u recs--home care, no skilled PT needs    4- COVID-19    Patient diagnosed with COVID 2 weeks ago on 1/22.   -He remains no hypoxic , saturation is good on room air.    -CXR 02/05/21: patchy infiltrates on PNA c/w COVID-19 PNA, f/up CXR 02/8/21: bilateral small patchy infiltrate and there is no evidence of pneumothorax nor pleural effusion  -  No need for decadron given patient is not hypoxic.  - Out of window for remdesivir   - monitor volume status closely, avoid aggressive hydration  -  CBC with diff and CMP QD. Ferritin, crp, d-dimer, procal at admission then will repeat If clinically worsening every 48-72 hours.  - VTE ppx with Eliquis, renally dose  -O2 qualification ordered- no need for home O2     5: Atrial fibrillation.    - Chronic, stable   - Currently rate controlled   - Continue metoprolol succinate 25 mg daily with hold parameters   - Continue Eliquis, renally adjusted     - 6: Hypothyroidism.   Chronic, stable  - TSH 0.39, borderline low, decreased Synthroid to 88 mcg daily   - F/up TSH in 6-8 weeks with PCP     7: HTN (hypertension).    - stable   -  with amlodipine 5 mg daily and toprol xl 25mg daily   - home dose of telmisartan-HCTZ on hold given ELIEZER, recommending f/up with PCP in 1 week to repeat BMP and adjust BP meds as necessary   - monitor hemodynamics.     8: Constipation.    Patient complaining of constipation for the past few days   - advice to use senna and Miralax prn.     ---  CONSULTANTS:   Nephrology  ---  TIME SPENT:  I, the attending physician, was physically present for the key portions of the evaluation and management (E/M) service provided. The total amount of time spent reviewing the hospital notes, laboratory values, imaging findings, assessing/counseling the patient, discussing with consultant physicians, social work, nursing staff was 36 minutes

## 2021-02-09 NOTE — DISCHARGE NOTE PROVIDER - NSDCMRMEDTOKEN_GEN_ALL_CORE_FT
amLODIPine 5 mg oral tablet: 1 tab(s) orally once a day  Eliquis 5 mg oral tablet: 1 tab(s) orally 2 times a day  levothyroxine 100 mcg (0.1 mg) oral tablet: 1 tab(s) orally once a day  telmisartan-hydrochlorothiazide 80mg-12.5mg oral tablet: 1 tab(s) orally once a day  Toprol-XL 25 mg oral tablet, extended release: 1 tab(s) orally once a day   acetaminophen 325 mg oral tablet: 2 tab(s) orally every 4 hours, As needed, Temp greater or equal to 38.5C (101.3F)  amLODIPine 5 mg oral tablet: 1 tab(s) orally once a day  Eliquis 5 mg oral tablet: 1 tab(s) orally 2 times a day  levothyroxine 100 mcg (0.1 mg) oral tablet: 1 tab(s) orally once a day  polyethylene glycol 3350 oral powder for reconstitution: 17 gram(s) orally once a day as needed for constipation   senna oral tablet: 2 tab(s) orally once a day (at bedtime) as needed for constipation   Toprol-XL 25 mg oral tablet, extended release: 1 tab(s) orally once a day

## 2021-02-09 NOTE — DISCHARGE NOTE PROVIDER - NSDCFUADDINST_GEN_ALL_CORE_FT
f/u with PCP Within 3 to 5 days  F/u with Nephro and all your patients  f/u with PCP Within 3 to 5 days  F/u with Nephro and all your doctors

## 2021-02-09 NOTE — CONSULT NOTE ADULT - SUBJECTIVE AND OBJECTIVE BOX
Patient is a 75y old  Male who presents with a chief complaint of ELIEZER, COVID (08 Feb 2021 16:34)    HPI:  Patient is a 75 year old male with PMH of afib on Eliquis, polycystic kidney diease, PPM (placed in 2019), HTN, SHRUTHI (not on CPAP) transferred from [facility] to [facility] for admission for weakness, frequent falls, and ELIEZER in setting of COVID19. History obtained from patient and wife Yamileth over the phone. Per patient and wife, patient was diagnosed with COVID 2 weeks ago (not sure of exact date). Since then, per wife, patient has been extremely weak and has not been eating or drinking properly. He has also been so weak that he was requiring assistance to walk and go to the bathroom. He had no fevers, chills, anosmia, SOB, cough, chest pain, or palpitations. WIfe states that yesterday morning, she brought patient to the bathroom and his "legs gave out " and he fell to the ground on his knees. He did not hit his head at the time. Denies LOC. She became concerned and called EMS to bring him to the hospital. Patient deniies any dizziness, lighheadedness, or palpitations when he fell. He admits to dizziness spells in the past that he had his PPM placed for. He is normally independently functional and walks without assistance. Denies any other acute complaints.     In the ER at [facility]:     VS: afebrile, HR: 88, BP: 130/80, RR: 16, SpO2: 95% on room air   Labs were significant for Na 130, K: 4.3, BUN/Cr: 64/2.14 serum glucose: 114   EKG: paced, atrial flutter, VR 91 bpm  CXR: Few patchy infiltrates.  CT Head: no acute intracranial pathology     Patient received 2 L LR bolus  (06 Feb 2021 00:47)      PAST MEDICAL HISTORY:  Polycystic kidney    Hemorrhoids without mention of complications    Depression    Bulging disc    Osteoarthritis    IBS (irritable bowel syndrome)    Erectile dysfunction    Hypothyroidism    HTN (hypertension)        PAST SURGICAL HISTORY:  Hernia NEC    Ulnar nerve entrapment    Lipoma    History of appendectomy        FAMILY HISTORY:  FH: polycystic kidney        SOCIAL HISTORY:    Allergies    No Known Allergies    Intolerances      Home Medications:  amLODIPine 5 mg oral tablet: 1 tab(s) orally once a day (06 Feb 2021 01:39)  Eliquis 5 mg oral tablet: 1 tab(s) orally 2 times a day (06 Feb 2021 01:39)  levothyroxine 100 mcg (0.1 mg) oral tablet: 1 tab(s) orally once a day (06 Feb 2021 01:39)  telmisartan-hydrochlorothiazide 80mg-12.5mg oral tablet: 1 tab(s) orally once a day (06 Feb 2021 01:39)  Toprol-XL 25 mg oral tablet, extended release: 1 tab(s) orally once a day (06 Feb 2021 01:39)    MEDICATIONS  (STANDING):  amLODIPine   Tablet 5 milliGRAM(s) Oral daily  apixaban 5 milliGRAM(s) Oral every 12 hours  levothyroxine 88 MICROGram(s) Oral daily  metoprolol succinate ER 25 milliGRAM(s) Oral daily  polyethylene glycol 3350 17 Gram(s) Oral daily  senna 2 Tablet(s) Oral at bedtime  sodium chloride 0.9%. 1000 milliLiter(s) (75 mL/Hr) IV Continuous <Continuous>    MEDICATIONS  (PRN):  acetaminophen   Tablet .. 650 milliGRAM(s) Oral every 4 hours PRN Temp greater or equal to 38.5C (101.3F)  magnesium hydroxide Suspension 30 milliLiter(s) Oral daily PRN Constipation      REVIEW OF SYSTEMS:  General:   Respiratory: No cough, SOB  Cardiovascular: No CP or Palpitations	  Gastrointestinal: No nausea, Vomiting. No diarrhea  Genitourinary: No urinary complaints	  Musculoskeletal: No leg swelling, No new rash or lesions	  Neurological: 	  all other systems negative    T(F): 98.1 (02-09-21 @ 08:47), Max: 98.3 (02-09-21 @ 05:15)  HR: 88 (02-09-21 @ 08:47) (76 - 88)  BP: 128/92 (02-09-21 @ 08:47) (128/92 - 134/93)  RR: 18 (02-09-21 @ 08:47) (18 - 18)  SpO2: 91% (02-09-21 @ 08:47) (90% - 91%)  Wt(kg): --    PHYSICAL EXAM:  General: NAD  Respiratory: b/l air entry  Cardiovascular: S1 S2  Gastrointestinal: soft  Extremities: edema        02-08    142  |  106  |  39<H>  ----------------------------<  92  3.4<L>   |  28  |  1.70<H>    Ca    8.5      08 Feb 2021 08:07    TPro  6.0  /  Alb  2.0<L>  /  TBili  0.5  /  DBili  x   /  AST  25  /  ALT  18  /  AlkPhos  66  02-08                          11.1   2.83  )-----------( 336      ( 08 Feb 2021 08:07 )             32.5       Hemoglobin: 11.1 g/dL (02-08 @ 08:07)  Hematocrit: 32.5 % (02-08 @ 08:07)  Blood Urea Nitrogen, Serum: 39 mg/dL (02-08 @ 08:07)  Calcium, Total Serum: 8.5 mg/dL (02-08 @ 08:07)      Creatinine, Serum: 1.70 (02-08 @ 08:07)  Creatinine, Serum: 1.90 (02-07 @ 07:13)        LIVER FUNCTIONS - ( 08 Feb 2021 08:07 )  Alb: 2.0 g/dL / Pro: 6.0 g/dL / ALK PHOS: 66 U/L / ALT: 18 U/L / AST: 25 U/L / GGT: x                       I&O's Detail    08 Feb 2021 07:01  -  09 Feb 2021 07:00  --------------------------------------------------------  IN:    Oral Fluid: 560 mL  Total IN: 560 mL    OUT:    Estimated Blood Loss (mL): 250 mL    Voided (mL): 300 mL  Total OUT: 550 mL    Total NET: 10 mL            Culture - Urine (collected 05 Feb 2021 21:04)  Source: .Urine Clean Catch (Midstream)  Final Report (06 Feb 2021 19:06):    No growth         Patient is a 75y old  Male who presents with a chief complaint of ELIEZER, COVID (08 Feb 2021 16:34)    HPI:  Patient is a 75 year old male with PMH of afib on Eliquis, polycystic kidney diease, PPM (placed in 2019), HTN, SHRUTHI (not on CPAP) transferred from [facility] to [facility] for admission for weakness, frequent falls, and ELIEZER in setting of COVID19. History obtained from patient and wife Yamileth over the phone. Per patient and wife, patient was diagnosed with COVID 2 weeks ago (not sure of exact date). Since then, per wife, patient has been extremely weak and has not been eating or drinking properly. He has also been so weak that he was requiring assistance to walk and go to the bathroom. He had no fevers, chills, anosmia, SOB, cough, chest pain, or palpitations. WIfe states that yesterday morning, she brought patient to the bathroom and his "legs gave out " and he fell to the ground on his knees. He did not hit his head at the time. Denies LOC. She became concerned and called EMS to bring him to the hospital. Patient deniies any dizziness, lighheadedness, or palpitations when he fell. He admits to dizziness spells in the past that he had his PPM placed for. He is normally independently functional and walks without assistance. Denies any other acute complaints.     In the ER at [facility]:     VS: afebrile, HR: 88, BP: 130/80, RR: 16, SpO2: 95% on room air   Labs were significant for Na 130, K: 4.3, BUN/Cr: 64/2.14 serum glucose: 114   EKG: paced, atrial flutter, VR 91 bpm  CXR: Few patchy infiltrates.  CT Head: no acute intracranial pathology     Patient received 2 L LR bolus  (06 Feb 2021 00:47)    Renal consult called for ELIEZER. Pt with h/o PCKD. Seen  by nephrologist in [place].   History obtained from chart and patient.     PAST MEDICAL HISTORY:  Polycystic kidney    Hemorrhoids without mention of complications    Depression    Bulging disc    Osteoarthritis    IBS (irritable bowel syndrome)    Erectile dysfunction    Hypothyroidism    HTN (hypertension)        PAST SURGICAL HISTORY:  Hernia NEC    Ulnar nerve entrapment    Lipoma    History of appendectomy        FAMILY HISTORY:  FH: polycystic kidney        SOCIAL HISTORY: No smoking or alcohol use     Allergies    No Known Allergies    Intolerances      Home Medications:  amLODIPine 5 mg oral tablet: 1 tab(s) orally once a day (06 Feb 2021 01:39)  Eliquis 5 mg oral tablet: 1 tab(s) orally 2 times a day (06 Feb 2021 01:39)  levothyroxine 100 mcg (0.1 mg) oral tablet: 1 tab(s) orally once a day (06 Feb 2021 01:39)  telmisartan-hydrochlorothiazide 80mg-12.5mg oral tablet: 1 tab(s) orally once a day (06 Feb 2021 01:39)  Toprol-XL 25 mg oral tablet, extended release: 1 tab(s) orally once a day (06 Feb 2021 01:39)    MEDICATIONS  (STANDING):  amLODIPine   Tablet 5 milliGRAM(s) Oral daily  apixaban 5 milliGRAM(s) Oral every 12 hours  levothyroxine 88 MICROGram(s) Oral daily  metoprolol succinate ER 25 milliGRAM(s) Oral daily  polyethylene glycol 3350 17 Gram(s) Oral daily  senna 2 Tablet(s) Oral at bedtime  sodium chloride 0.9%. 1000 milliLiter(s) (75 mL/Hr) IV Continuous <Continuous>    MEDICATIONS  (PRN):  acetaminophen   Tablet .. 650 milliGRAM(s) Oral every 4 hours PRN Temp greater or equal to 38.5C (101.3F)  magnesium hydroxide Suspension 30 milliLiter(s) Oral daily PRN Constipation      REVIEW OF SYSTEMS:  General: no distress  Respiratory: No cough, SOB  Cardiovascular: No CP or Palpitations	  Gastrointestinal: No nausea, Vomiting. No diarrhea  Genitourinary: No urinary complaints	  Musculoskeletal: No new rash or lesions		  all other systems negative    T(F): 98.1 (02-09-21 @ 08:47), Max: 98.3 (02-09-21 @ 05:15)  HR: 88 (02-09-21 @ 08:47) (76 - 88)  BP: 128/92 (02-09-21 @ 08:47) (128/92 - 134/93)  RR: 18 (02-09-21 @ 08:47) (18 - 18)  SpO2: 91% (02-09-21 @ 08:47) (90% - 91%)  Wt(kg): --    PHYSICAL EXAM:  General: NAD  Respiratory: b/l air entry  Cardiovascular: S1 S2  Gastrointestinal: soft  Extremities: no edema        02-08    142  |  106  |  39<H>  ----------------------------<  92  3.4<L>   |  28  |  1.70<H>    Ca    8.5      08 Feb 2021 08:07    TPro  6.0  /  Alb  2.0<L>  /  TBili  0.5  /  DBili  x   /  AST  25  /  ALT  18  /  AlkPhos  66  02-08                          11.1   2.83  )-----------( 336      ( 08 Feb 2021 08:07 )             32.5       Hemoglobin: 11.1 g/dL (02-08 @ 08:07)  Hematocrit: 32.5 % (02-08 @ 08:07)  Blood Urea Nitrogen, Serum: 39 mg/dL (02-08 @ 08:07)  Calcium, Total Serum: 8.5 mg/dL (02-08 @ 08:07)      Creatinine, Serum: 1.70 (02-08 @ 08:07)  Creatinine, Serum: 1.90 (02-07 @ 07:13)        LIVER FUNCTIONS - ( 08 Feb 2021 08:07 )  Alb: 2.0 g/dL / Pro: 6.0 g/dL / ALK PHOS: 66 U/L / ALT: 18 U/L / AST: 25 U/L / GGT: x               I&O's Detail    08 Feb 2021 07:01  -  09 Feb 2021 07:00  --------------------------------------------------------  IN:    Oral Fluid: 560 mL  Total IN: 560 mL    OUT:    Estimated Blood Loss (mL): 250 mL    Voided (mL): 300 mL  Total OUT: 550 mL    Total NET: 10 mL            Culture - Urine (collected 05 Feb 2021 21:04)  Source: .Urine Clean Catch (Midstream)  Final Report (06 Feb 2021 19:06):    No growth    < from: Xray Chest 1 View- PORTABLE-Routine (Xray Chest 1 View- PORTABLE-Routine in AM.) (02.08.21 @ 13:32) >    EXAM:  XR CHEST PORTABLE ROUTINE 1V                            PROCEDURE DATE:  02/08/2021          INTERPRETATION:  Chest one view    HISTORY: Shortness of breath    COMPARISON STUDY: 2/5/2021    Frontal expiratory view of the chest shows the heart to be normal in size. Left cardiac pacemaker is again noted.    The lungs show bilateral small patchy infiltrates and there is no evidence of pneumothorax nor pleural effusion.    IMPRESSION:  Patchy infiltrates bilaterally.      Thank you for the courtesy of this referral.      YANI BEAN MD; Attending Interventional Radiologist  This document has been electronically signed. Feb 8 2021  4:04PM    < end of copied text >    < from: US Kidney and Bladder (02.06.21 @ 09:20) >    EXAM:  US KIDNEYS AND BLADDER                            PROCEDURE DATE:  02/06/2021          INTERPRETATION:  CLINICAL INFORMATION: Acute renal insufficiency, polycystic kidney disease    COMPARISON: None available.    TECHNIQUE: Sonography of thekidneys and bladder.    FINDINGS:    Right kidney: 11.8 cm. No hydronephrosis. Numerous cysts.    Left kidney: 9.7 cm. No hydronephrosis. Numerous cysts.    Urinary bladder: Enlarged prostate gland protruding into the base of the bladder. Mild to moderate post void residual.    IMPRESSION:    Findings compatible with known polycystic kidney disease. No hydronephrosis bilaterally.    Enlarged prostate gland measuring into the base of bladder with mild to moderate postvoid residual        LESA STOLL M.D., ATTENDING RADIOLOGIST  This document has been electronically signed. Feb 6 2021  2:30PM    < end of copied text >

## 2021-02-09 NOTE — DISCHARGE NOTE PROVIDER - NSDCCPCAREPLAN_GEN_ALL_CORE_FT
PRINCIPAL DISCHARGE DIAGNOSIS  Diagnosis: ELIEZER (acute kidney injury)  Assessment and Plan of Treatment: ELIEZER (acute kidney injury) due to poor intake dehydration in setting of COVID. improve dwith IV fluids, home meds were modified. recommending to f/up with PCP and primary nephrology        SECONDARY DISCHARGE DIAGNOSES  Diagnosis: HTN (hypertension)  Assessment and Plan of Treatment: HTN (hypertension), stable, meds adjsuted. advice compliance with low salt diet and medication. f/up with PCP to adjust med dose    Diagnosis: Hypothyroidism  Assessment and Plan of Treatment: Hypothyroidism, levothyroxine dose adjusted, needs f/up TSH in 6-8 weeks    Diagnosis: Weakness  Assessment and Plan of Treatment: Weakness in setting of covid.    Diagnosis: Atrial fibrillation  Assessment and Plan of Treatment: Atrial fibrillation. rate control continue with current management    Diagnosis: 2019 novel coronavirus disease (COVID-19)  Assessment and Plan of Treatment: 2019 novel coronavirus disease (COVID-19) no need for O2, steroids or remdesivir.    Diagnosis: Constipation  Assessment and Plan of Treatment: Constipation continue with stool softeners as needed.     PRINCIPAL DISCHARGE DIAGNOSIS  Diagnosis: ELIEZER (acute kidney injury)  Assessment and Plan of Treatment: ELIEZER (acute kidney injury) due to poor intake dehydration in setting of COVID and medication.  improve dwith IV fluids, home meds were modified. recommending to f/up with PCP and primary nephrology  Do not take Losartan/ HCTZ , can cotinue Norvasc and Metoprolol and BP is stable. F/u with PCP to adjust medications as needed and chek blood work.         SECONDARY DISCHARGE DIAGNOSES  Diagnosis: 2019 novel coronavirus disease (COVID-19)  Assessment and Plan of Treatment: 2019 novel coronavirus disease (COVID-19) no need for O2, steroids or remdesivir.    Diagnosis: Weakness  Assessment and Plan of Treatment: Weakness in setting of covid.    Diagnosis: Atrial fibrillation  Assessment and Plan of Treatment: Atrial fibrillation. rate control continue with current management    Diagnosis: Hypothyroidism  Assessment and Plan of Treatment: Hypothyroidism, levothyroxine dose adjusted, needs f/up TSH in 6-8 weeks    Diagnosis: HTN (hypertension)  Assessment and Plan of Treatment: HTN (hypertension), stable, meds adjsuted. advice compliance with low salt diet and medication. f/up with PCP to adjust med dose    Diagnosis: Constipation  Assessment and Plan of Treatment: Constipation continue with stool softeners as needed.

## 2021-02-09 NOTE — DISCHARGE NOTE PROVIDER - CARE PROVIDER_API CALL
[facility] [address], [address] [place] [address]  Phone: [phone]  Fax: [phone]  Follow Up Time:

## 2021-02-10 ENCOUNTER — TRANSCRIPTION ENCOUNTER (OUTPATIENT)
Age: 76
End: 2021-02-10

## 2021-02-10 VITALS
OXYGEN SATURATION: 92 % | DIASTOLIC BLOOD PRESSURE: 71 MMHG | RESPIRATION RATE: 18 BRPM | SYSTOLIC BLOOD PRESSURE: 112 MMHG | TEMPERATURE: 98 F | HEART RATE: 57 BPM

## 2021-02-10 LAB
ANION GAP SERPL CALC-SCNC: 5 MMOL/L — SIGNIFICANT CHANGE UP (ref 5–17)
BUN SERPL-MCNC: 29 MG/DL — HIGH (ref 7–23)
CALCIUM SERPL-MCNC: 8.6 MG/DL — SIGNIFICANT CHANGE UP (ref 8.5–10.1)
CHLORIDE SERPL-SCNC: 110 MMOL/L — HIGH (ref 96–108)
CO2 SERPL-SCNC: 28 MMOL/L — SIGNIFICANT CHANGE UP (ref 22–31)
CREAT SERPL-MCNC: 1.6 MG/DL — HIGH (ref 0.5–1.3)
GLUCOSE SERPL-MCNC: 82 MG/DL — SIGNIFICANT CHANGE UP (ref 70–99)
HCT VFR BLD CALC: 33.7 % — LOW (ref 39–50)
HGB BLD-MCNC: 11.2 G/DL — LOW (ref 13–17)
MCHC RBC-ENTMCNC: 30.8 PG — SIGNIFICANT CHANGE UP (ref 27–34)
MCHC RBC-ENTMCNC: 33.2 GM/DL — SIGNIFICANT CHANGE UP (ref 32–36)
MCV RBC AUTO: 92.6 FL — SIGNIFICANT CHANGE UP (ref 80–100)
NRBC # BLD: 0 /100 WBCS — SIGNIFICANT CHANGE UP (ref 0–0)
PLATELET # BLD AUTO: 461 K/UL — HIGH (ref 150–400)
POTASSIUM SERPL-MCNC: 4.7 MMOL/L — SIGNIFICANT CHANGE UP (ref 3.5–5.3)
POTASSIUM SERPL-SCNC: 4.7 MMOL/L — SIGNIFICANT CHANGE UP (ref 3.5–5.3)
RBC # BLD: 3.64 M/UL — LOW (ref 4.2–5.8)
RBC # FLD: 13.3 % — SIGNIFICANT CHANGE UP (ref 10.3–14.5)
SODIUM SERPL-SCNC: 143 MMOL/L — SIGNIFICANT CHANGE UP (ref 135–145)
WBC # BLD: 3.64 K/UL — LOW (ref 3.8–10.5)
WBC # FLD AUTO: 3.64 K/UL — LOW (ref 3.8–10.5)

## 2021-02-10 PROCEDURE — 84443 ASSAY THYROID STIM HORMONE: CPT

## 2021-02-10 PROCEDURE — 99239 HOSP IP/OBS DSCHRG MGMT >30: CPT

## 2021-02-10 PROCEDURE — 85027 COMPLETE CBC AUTOMATED: CPT

## 2021-02-10 PROCEDURE — 80048 BASIC METABOLIC PNL TOTAL CA: CPT

## 2021-02-10 PROCEDURE — 86769 SARS-COV-2 COVID-19 ANTIBODY: CPT

## 2021-02-10 PROCEDURE — 97116 GAIT TRAINING THERAPY: CPT

## 2021-02-10 PROCEDURE — 82728 ASSAY OF FERRITIN: CPT

## 2021-02-10 PROCEDURE — 83036 HEMOGLOBIN GLYCOSYLATED A1C: CPT

## 2021-02-10 PROCEDURE — 85379 FIBRIN DEGRADATION QUANT: CPT

## 2021-02-10 PROCEDURE — 83735 ASSAY OF MAGNESIUM: CPT

## 2021-02-10 PROCEDURE — 84145 PROCALCITONIN (PCT): CPT

## 2021-02-10 PROCEDURE — 36415 COLL VENOUS BLD VENIPUNCTURE: CPT

## 2021-02-10 PROCEDURE — 84100 ASSAY OF PHOSPHORUS: CPT

## 2021-02-10 PROCEDURE — 86803 HEPATITIS C AB TEST: CPT

## 2021-02-10 PROCEDURE — 83615 LACTATE (LD) (LDH) ENZYME: CPT

## 2021-02-10 PROCEDURE — 80053 COMPREHEN METABOLIC PANEL: CPT

## 2021-02-10 PROCEDURE — 71045 X-RAY EXAM CHEST 1 VIEW: CPT

## 2021-02-10 PROCEDURE — 97161 PT EVAL LOW COMPLEX 20 MIN: CPT

## 2021-02-10 PROCEDURE — 76770 US EXAM ABDO BACK WALL COMP: CPT

## 2021-02-10 PROCEDURE — 85025 COMPLETE CBC W/AUTO DIFF WBC: CPT

## 2021-02-10 RX ORDER — ACETAMINOPHEN 500 MG
2 TABLET ORAL
Qty: 0 | Refills: 0 | DISCHARGE
Start: 2021-02-10

## 2021-02-10 RX ORDER — SENNA PLUS 8.6 MG/1
2 TABLET ORAL
Qty: 0 | Refills: 0 | DISCHARGE
Start: 2021-02-10

## 2021-02-10 RX ORDER — POLYETHYLENE GLYCOL 3350 17 G/17G
17 POWDER, FOR SOLUTION ORAL
Qty: 0 | Refills: 0 | DISCHARGE
Start: 2021-02-10

## 2021-02-10 RX ORDER — TELMISARTAN AND HYDROCHLOROTHIAZIDE 40; 12.5 MG/1; MG/1
1 TABLET ORAL
Qty: 0 | Refills: 0 | DISCHARGE

## 2021-02-10 RX ADMIN — APIXABAN 5 MILLIGRAM(S): 2.5 TABLET, FILM COATED ORAL at 05:26

## 2021-02-10 RX ADMIN — AMLODIPINE BESYLATE 5 MILLIGRAM(S): 2.5 TABLET ORAL at 05:26

## 2021-02-10 RX ADMIN — Medication 88 MICROGRAM(S): at 05:26

## 2021-02-10 NOTE — PROGRESS NOTE ADULT - ASSESSMENT
ELIEZER on CKD 3, + PCKD: Prerenal azotemia, On ARB rx, ? COVID nephropathy  COVID +, Pneumonia  Hypertension  Hypokalemia    Improved renal indices. Most likely at baseline. To continue current meds. Rx for COVID. Monitor BP trend. Titrate BP meds as needed. Salt restriction.   Avoid nephrotoxic meds as possible. D/c planning. D/w patient regarding need for out patient nephrology follow up.

## 2021-02-10 NOTE — DISCHARGE NOTE NURSING/CASE MANAGEMENT/SOCIAL WORK - PATIENT PORTAL LINK FT
You can access the FollowMyHealth Patient Portal offered by [facility] by registering at the following website: [email or web address]. By joining [email or web address]’s FollowMyHealth portal, you will also be able to view your health information using other applications (apps) compatible with our system.

## 2021-02-10 NOTE — PROGRESS NOTE ADULT - SUBJECTIVE AND OBJECTIVE BOX
Patient is a 75y old  Male who presents with a chief complaint of ELIEZER, COVID (09 Feb 2021 15:37)  Patient seen in follow up for ELIEZER, h/o PCKD.        PAST MEDICAL HISTORY:  Polycystic kidney    Hemorrhoids without mention of complications    Depression    Bulging disc    Osteoarthritis    IBS (irritable bowel syndrome)    Erectile dysfunction    Hypothyroidism    HTN (hypertension)      MEDICATIONS  (STANDING):  amLODIPine   Tablet 5 milliGRAM(s) Oral daily  apixaban 5 milliGRAM(s) Oral every 12 hours  levothyroxine 88 MICROGram(s) Oral daily  metoprolol succinate ER 25 milliGRAM(s) Oral daily  polyethylene glycol 3350 17 Gram(s) Oral daily  senna 2 Tablet(s) Oral at bedtime  sodium chloride 0.45%. 1000 milliLiter(s) (50 mL/Hr) IV Continuous <Continuous>    MEDICATIONS  (PRN):  acetaminophen   Tablet .. 650 milliGRAM(s) Oral every 4 hours PRN Temp greater or equal to 38.5C (101.3F)  magnesium hydroxide Suspension 30 milliLiter(s) Oral daily PRN Constipation    T(C): 36.9 (02-10-21 @ 11:43), Max: 37.1 (02-09-21 @ 16:46)  HR: 57 (02-10-21 @ 11:43) (57 - 88)  BP: 112/71 (02-10-21 @ 11:43) (112/71 - 135/80)  RR: 18 (02-10-21 @ 11:43) (18 - 18)  SpO2: 92% (02-10-21 @ 11:43) (91% - 94%)  Wt(kg): --  I&O's Detail    09 Feb 2021 07:01  -  10 Feb 2021 07:00  --------------------------------------------------------  IN:    Oral Fluid: 740 mL    sodium chloride 0.45%: 850 mL  Total IN: 1590 mL    OUT:    Voided (mL): 900 mL  Total OUT: 900 mL    Total NET: 690 mL      10 Feb 2021 07:01  -  10 Feb 2021 13:50  --------------------------------------------------------  IN:  Total IN: 0 mL    OUT:    Voided (mL): 400 mL  Total OUT: 400 mL    Total NET: -400 mL          PHYSICAL EXAM:  General: No distress  Respiratory: b/l air entry  Cardiovascular: S1 S2  Gastrointestinal: soft  Extremities:  no edema                              11.2   3.64  )-----------( 461      ( 10 Feb 2021 07:09 )             33.7     02-10    143  |  110<H>  |  29<H>  ----------------------------<  82  4.7   |  28  |  1.60<H>    Ca    8.6      10 Feb 2021 07:09        Sodium, Serum: 143 (02-10 @ 07:09)  Sodium, Serum: 141 (02-09 @ 12:04)  Sodium, Serum: 142 (02-08 @ 08:07)  Sodium, Serum: 139 (02-07 @ 07:13)    Creatinine, Serum: 1.60 (02-10 @ 07:09)  Creatinine, Serum: 1.70 (02-09 @ 12:04)  Creatinine, Serum: 1.70 (02-08 @ 08:07)  Creatinine, Serum: 1.90 (02-07 @ 07:13)    Potassium, Serum: 4.7 (02-10 @ 07:09)  Potassium, Serum: 4.1 (02-09 @ 12:04)  Potassium, Serum: 3.4 (02-08 @ 08:07)  Potassium, Serum: 3.0 (02-07 @ 07:13)    Hemoglobin: 11.2 (02-10 @ 07:09)  Hemoglobin: 11.1 (02-08 @ 08:07)  Hemoglobin: 11.7 (02-07 @ 07:13)    
Patient is a 75y old  Male who presents with a chief complaint of ELIEZER, COVID     ----  INTERVAL HPI/OVERNIGHT EVENTS: Pt seen and evaluated at the bedside. No acute overnight events occurred. reports no complains.    ----  PAST MEDICAL & SURGICAL HISTORY:  Polycystic kidney  vague history unsure as to diagnosis, states &quot;functioning normal &quot;    Hemorrhoids without mention of complications    Bulging disc  cervical spine 10 years ago - deneies any radicular pain    Osteoarthritis    IBS (irritable bowel syndrome)    Erectile dysfunction  takes viagra prn    Hypothyroidism  last tft&#x27;s - 1 month ago    HTN (hypertension)  10 years    Hernia NEC  repair approximately 10 years ago    Ulnar nerve entrapment  right wrist decompression - 2000    Lipoma  multiple  times - last 10 years from neck and thigh in past    History of appendectomy  1990&#x27;s        FAMILY HISTORY:  FH: polycystic kidney         Allergies    No Known Allergies    Intolerances        ----  MEDICATIONS  (STANDING):  amLODIPine   Tablet 5 milliGRAM(s) Oral daily  apixaban 5 milliGRAM(s) Oral every 12 hours  levothyroxine 88 MICROGram(s) Oral daily  metoprolol succinate ER 25 milliGRAM(s) Oral daily  polyethylene glycol 3350 17 Gram(s) Oral daily  senna 2 Tablet(s) Oral at bedtime  sodium chloride 0.9%. 1000 milliLiter(s) (75 mL/Hr) IV Continuous <Continuous>    MEDICATIONS  (PRN):  acetaminophen   Tablet .. 650 milliGRAM(s) Oral every 4 hours PRN Temp greater or equal to 38.5C (101.3F)  magnesium hydroxide Suspension 30 milliLiter(s) Oral daily PRN Constipation      ----  REVIEW OF SYSTEMS:  CONSTITUTIONAL: denies fever, chills, fatigue, weakness  HEENT: denies blurred vision, sore throat  SKIN: denies new lesions, rash  CARDIOVASCULAR: denies chest pain, chest pressure, palpitations  RESPIRATORY: denies shortness of breath, sputum production  GASTROINTESTINAL: denies nausea, vomiting, diarrhea, abdominal pain  GENITOURINARY: denies dysuria, discharge  NEUROLOGICAL: denies numbness, headache, focal weakness  MUSCULOSKELETAL: denies new joint pain, muscle aches    ----  PHYSICAL EXAM:  GENERAL: patient appears well, no acute distress, appropriately interactive  EYES: sclera clear, no exudates  ENMT:  moist mucous membranes  NECK: supple, soft, no thyromegaly noted  LUNGS: good air entry bilaterally, clear to auscultation, symmetric breath sounds, no wheezing or rhonchi appreciated  HEART: soft S1/S2, regular rate and rhythm, no murmurs noted, no noted edema to b/l LE  GASTROINTESTINAL: abdomen is soft, nontender, nondistended, normoactive bowel sounds, no palpable masses  INTEGUMENT:  no visualized rashes, no jaundice noted  MUSCULOSKELETAL: no clubbing or cyanosis, no obvious deformity  NEUROLOGIC: awake, alert, oriented x3, good muscle tone in 4 extremities, no obvious sensory deficits      T(C): 36.4 (02-08-21 @ 08:40), Max: 37 (02-08-21 @ 04:43)  HR: 82 (02-08-21 @ 08:40) (80 - 84)  BP: 125/82 (02-08-21 @ 08:40) (118/76 - 125/82)  RR: 18 (02-08-21 @ 08:40) (17 - 19)  SpO2: 89% (02-08-21 @ 08:40) (89% - 92%)  Wt(kg): --    ----  I&O's Summary    07 Feb 2021 07:01  -  08 Feb 2021 07:00  --------------------------------------------------------  IN: 0 mL / OUT: 1050 mL / NET: -1050 mL    08 Feb 2021 07:01  -  08 Feb 2021 16:35  --------------------------------------------------------  IN: 300 mL / OUT: 300 mL / NET: 0 mL        LABS:                        11.1   2.83  )-----------( 336      ( 08 Feb 2021 08:07 )             32.5     02-08    142  |  106  |  39<H>  ----------------------------<  92  3.4<L>   |  28  |  1.70<H>    Ca    8.5      08 Feb 2021 08:07    TPro  6.0  /  Alb  2.0<L>  /  TBili  0.5  /  DBili  x   /  AST  25  /  ALT  18  /  AlkPhos  66  02-08        CAPILLARY BLOOD GLUCOSE          02-05 @ 21:04   No growth  --  --                
Patient is a 75y old  Male who presents with a chief complaint of ELIEZER, COVID (2021 00:47)      FROM ADMISSION H+P:   HPI:  Patient is a 75 year old male with PMH of afib on Eliquis, polycystic kidney diease, PPM (placed in 2019), HTN, SHRUTHI (not on CPAP) transferred from [facility] to [facility] for admission for weakness, frequent falls, and ELIEZER in setting of COVID19. History obtained from patient and wife Yamileth over the phone. Per patient and wife, patient was diagnosed with COVID 2 weeks ago (not sure of exact date). Since then, per wife, patient has been extremely weak and has not been eating or drinking properly. He has also been so weak that he was requiring assistance to walk and go to the bathroom. He had no fevers, chills, anosmia, SOB, cough, chest pain, or palpitations. WIfe states that yesterday morning, she brought patient to the bathroom and his "legs gave out " and he fell to the ground on his knees. He did not hit his head at the time. Denies LOC. She became concerned and called EMS to bring him to the hospital. Patient deniies any dizziness, lighheadedness, or palpitations when he fell. He admits to dizziness spells in the past that he had his PPM placed for. He is normally independently functional and walks without assistance. Denies any other acute complaints.     In the ER at [facility]:     VS: afebrile, HR: 88, BP: 130/80, RR: 16, SpO2: 95% on room air   Labs were significant for Na 130, K: 4.3, BUN/Cr: 64/2.14 serum glucose: 114   EKG: paced, atrial flutter, VR 91 bpm  CXR: Few patchy infiltrates.  CT Head: no acute intracranial pathology     Patient received 2 L LR bolus  (2021 00:47)      ----  INTERVAL HPI/OVERNIGHT EVENTS: Pt seen and evaluated at the bedside. No acute overnight events occurred. reports no complains    ----  PAST MEDICAL & SURGICAL HISTORY:  Polycystic kidney  vague history unsure as to diagnosis, states &quot;functioning normal &quot;    Hemorrhoids without mention of complications    Bulging disc  cervical spine 10 years ago - deneies any radicular pain    Osteoarthritis    IBS (irritable bowel syndrome)    Erectile dysfunction  takes viagra prn    Hypothyroidism  last tft&#x27;s - 1 month ago    HTN (hypertension)  10 years    Hernia NEC  repair approximately 10 years ago    Ulnar nerve entrapment  right wrist decompression -     Lipoma  multiple  times - last 10 years from neck and thigh in past    History of appendectomy  &#x27;s        FAMILY HISTORY:  FH: polycystic kidney         Allergies    No Known Allergies    Intolerances        ----  MEDICATIONS  (STANDING):  amLODIPine   Tablet 5 milliGRAM(s) Oral daily  apixaban 2.5 milliGRAM(s) Oral two times a day  levothyroxine 100 MICROGram(s) Oral daily  metoprolol succinate ER 25 milliGRAM(s) Oral daily  polyethylene glycol 3350 17 Gram(s) Oral daily  senna 2 Tablet(s) Oral at bedtime  sodium chloride 0.9%. 1000 milliLiter(s) (75 mL/Hr) IV Continuous <Continuous>    MEDICATIONS  (PRN):  acetaminophen   Tablet .. 650 milliGRAM(s) Oral every 4 hours PRN Temp greater or equal to 38.5C (101.3F)      ----  REVIEW OF SYSTEMS:  CONSTITUTIONAL: denies fever, chills, fatigue, weakness  HEENT: denies blurred vision, sore throat  SKIN: denies new lesions, rash  CARDIOVASCULAR: denies chest pain, chest pressure, palpitations  RESPIRATORY: denies shortness of breath, sputum production  GASTROINTESTINAL: denies nausea, vomiting, diarrhea, abdominal pain  GENITOURINARY: denies dysuria, discharge  NEUROLOGICAL: denies numbness, headache, focal weakness  MUSCULOSKELETAL: denies new joint pain, muscle aches  HEMATOLOGIC: denies gross bleeding, bruising  LYMPHATICS: denies enlarged lymph nodes, extremity swelling    ----  PHYSICAL EXAM:  GENERAL: patient appears well, no acute distress, appropriately interactive  EYES: sclera clear, no exudates  ENMT:  moist mucous membranes  NECK: supple, soft, no thyromegaly noted  LUNGS: good air entry bilaterally, clear to auscultation, symmetric breath sounds, no wheezing or rhonchi appreciated  HEART: soft S1/S2, regular rate and rhythm, no murmurs noted, no noted edema to b/l LE  GASTROINTESTINAL: abdomen is soft, nontender, nondistended, normoactive bowel sounds, no palpable masses  INTEGUMENT:  no visualized rashes, no jaundice noted  MUSCULOSKELETAL: no clubbing or cyanosis, no obvious deformity  NEUROLOGIC: awake, alert, oriented x3, good muscle tone in 4 extremities, no obvious sensory deficits      T(C): 37.2 (21 @ 16:14), Max: 37.8 (21 @ 04:54)  HR: 78 (21 @ 16:14) (78 - 94)  BP: 108/79 (21 @ 16:14) (108/79 - 138/80)  RR: 19 (21 @ 16:14) (17 - 19)  SpO2: 91% (21 @ 16:14) (90% - 97%)  Wt(kg): --    ----  I&O's Summary    2021 07:01  -  2021 07:00  --------------------------------------------------------  IN: 450 mL / OUT: 200 mL / NET: 250 mL    2021 07:01  -  2021 19:15  --------------------------------------------------------  IN: 885 mL / OUT: 0 mL / NET: 885 mL        LABS:                        13.1   5.00  )-----------( 258      ( 2021 08:15 )             38.1     02-06    139  |  101  |  64<H>  ----------------------------<  82  3.6   |  29  |  2.30<H>    Ca    8.9      2021 08:15  Phos  2.1     02-06  Mg     2.4     02-06    TPro  6.9  /  Alb  2.4<L>  /  TBili  0.9  /  DBili  x   /  AST  31  /  ALT  21  /  AlkPhos  65  02-06    PT/INR - ( 2021 15:20 )   PT: 17.4 sec;   INR: 1.48 ratio         PTT - ( 2021 15:20 )  PTT:34.7 sec  Urinalysis Basic - ( 2021 17:38 )    Color: Yellow / Appearance: Clear / S.010 / pH: x  Gluc: x / Ketone: Negative  / Bili: Negative / Urobili: Negative   Blood: x / Protein: 30 mg/dL / Nitrite: Negative   Leuk Esterase: Negative / RBC: 4 /hpf / WBC 1 /HPF   Sq Epi: x / Non Sq Epi: 1 /hpf / Bacteria: Negative      CAPILLARY BLOOD GLUCOSE           @ 21:04   No growth  --  --          Culture - Urine (collected 21 @ 21:04)  Source: .Urine Clean Catch (Midstream)  Final Report (21 @ 19:06):    No growth            
Patient is a 75y old  Male who presents with a chief complaint of ELIEZER, COVID     ----  INTERVAL HPI/OVERNIGHT EVENTS: Pt seen and evaluated at the bedside. No acute overnight events occurred. reports no complains    ----  PAST MEDICAL & SURGICAL HISTORY:  Polycystic kidney  vague history unsure as to diagnosis, states &quot;functioning normal &quot;    Hemorrhoids without mention of complications    Bulging disc  cervical spine 10 years ago - deneies any radicular pain    Osteoarthritis    IBS (irritable bowel syndrome)    Erectile dysfunction  takes viagra prn    Hypothyroidism  last tft&#x27;s - 1 month ago    HTN (hypertension)  10 years    Hernia NEC  repair approximately 10 years ago    Ulnar nerve entrapment  right wrist decompression - 2000    Lipoma  multiple  times - last 10 years from neck and thigh in past    History of appendectomy  &#x27;s        FAMILY HISTORY:  FH: polycystic kidney         Allergies    No Known Allergies    Intolerances        ----  MEDICATIONS  (STANDING):  amLODIPine   Tablet 5 milliGRAM(s) Oral daily  apixaban 5 milliGRAM(s) Oral every 12 hours  levothyroxine 100 MICROGram(s) Oral daily  metoprolol succinate ER 25 milliGRAM(s) Oral daily  polyethylene glycol 3350 17 Gram(s) Oral daily  potassium chloride    Tablet ER 40 milliEquivalent(s) Oral once  senna 2 Tablet(s) Oral at bedtime  sodium chloride 0.9%. 1000 milliLiter(s) (75 mL/Hr) IV Continuous <Continuous>    MEDICATIONS  (PRN):  acetaminophen   Tablet .. 650 milliGRAM(s) Oral every 4 hours PRN Temp greater or equal to 38.5C (101.3F)    ----  REVIEW OF SYSTEMS:  CONSTITUTIONAL: denies fever, chills, fatigue, weakness  HEENT: denies blurred vision, sore throat  SKIN: denies new lesions, rash  CARDIOVASCULAR: denies chest pain, chest pressure, palpitations  RESPIRATORY: denies shortness of breath, sputum production  GASTROINTESTINAL: denies nausea, vomiting, diarrhea, abdominal pain  GENITOURINARY: denies dysuria, discharge  NEUROLOGICAL: denies numbness, headache, focal weakness  MUSCULOSKELETAL: denies new joint pain, muscle aches    ----  PHYSICAL EXAM:  GENERAL: patient appears well, no acute distress, appropriately interactive  EYES: sclera clear, no exudates  ENMT:  moist mucous membranes  NECK: supple, soft, no thyromegaly noted  LUNGS: good air entry bilaterally, clear to auscultation, symmetric breath sounds, no wheezing or rhonchi appreciated  HEART: soft S1/S2, regular rate and rhythm, no murmurs noted, no noted edema to b/l LE  GASTROINTESTINAL: abdomen is soft, nontender, nondistended, normoactive bowel sounds, no palpable masses  INTEGUMENT:  no visualized rashes, no jaundice noted  MUSCULOSKELETAL: no clubbing or cyanosis, no obvious deformity  NEUROLOGIC: awake, alert, oriented x3, good muscle tone in 4 extremities, no obvious sensory deficits      Vital Signs Last 24 Hrs  T(C): 37.3 (2021 10:06), Max: 37.8 (2021 23:55)  T(F): 99.1 (2021 10:06), Max: 100.1 (2021 23:55)  HR: 78 (2021 10:06) (78 - 80)  BP: 113/73 (2021 10:06) (113/73 - 136/91)  BP(mean): --  RR: 18 (2021 10:06) (18 - 19)  SpO2: 93% (2021 10:06) (91% - 94%)    ----  I&O's Summary      2021 07:01  -  2021 07:00  --------------------------------------------------------  IN: 885 mL / OUT: 0 mL / NET: 885 mL      2021 07:01  -  2021 07:00  --------------------------------------------------------  IN: 450 mL / OUT: 200 mL / NET: 250 mL    2021 07:01  -  2021 19:15  --------------------------------------------------------  IN: 885 mL / OUT: 0 mL / NET: 885 mL        LABS:                        11.7   4.00  )-----------( 287      ( 2021 07:13 )             33.9                         13.1   5.00  )-----------( 258      ( 2021 08:15 )             38.1       02-07    139  |  103  |  50<H>  ----------------------------<  96  3.0<L>   |  27  |  1.90<H>    Ca    8.6      2021 07:13  Phos  2.1     02-06  Mg     2.4     02-06    TPro  6.3  /  Alb  2.1<L>  /  TBili  0.7  /  DBili  x   /  AST  29  /  ALT  19  /  AlkPhos  67  02-07    02-06    139  |  101  |  64<H>  ----------------------------<  82  3.6   |  29  |  2.30<H>    Ca    8.9      2021 08:15  Phos  2.1     02-06  Mg     2.4     02-06    TPro  6.9  /  Alb  2.4<L>  /  TBili  0.9  /  DBili  x   /  AST  31  /  ALT  21  /  AlkPhos  65  02-06    PT/INR - ( 2021 15:20 )   PT: 17.4 sec;   INR: 1.48 ratio         PTT - ( 2021 15:20 )  PTT:34.7 sec  Urinalysis Basic - ( 2021 17:38 )    Color: Yellow / Appearance: Clear / S.010 / pH: x  Gluc: x / Ketone: Negative  / Bili: Negative / Urobili: Negative   Blood: x / Protein: 30 mg/dL / Nitrite: Negative   Leuk Esterase: Negative / RBC: 4 /hpf / WBC 1 /HPF   Sq Epi: x / Non Sq Epi: 1 /hpf / Bacteria: Negative      CAPILLARY BLOOD GLUCOSE           @ 21:04   No growth  --  --          Culture - Urine (collected 21 @ 21:04)  Source: .Urine Clean Catch (Midstream)  Final Report (21 @ 19:06):    No growth            
Patient is a 75y old  Male who presents with a chief complaint of ELIEZER, COVID (09 Feb 2021 09:42)    ----  INTERVAL HPI/OVERNIGHT EVENTS: Pt seen and evaluated at the bedside. No acute overnight events occurred. feeling well.    ----  PAST MEDICAL & SURGICAL HISTORY:  Polycystic kidney  vague history unsure as to diagnosis, states &quot;functioning normal &quot;    Hemorrhoids without mention of complications    Bulging disc  cervical spine 10 years ago - deneies any radicular pain    Osteoarthritis    IBS (irritable bowel syndrome)    Erectile dysfunction  takes viagra prn    Hypothyroidism  last tft&#x27;s - 1 month ago    HTN (hypertension)  10 years    Hernia NEC  repair approximately 10 years ago    Ulnar nerve entrapment  right wrist decompression - 2000    Lipoma  multiple  times - last 10 years from neck and thigh in past    History of appendectomy  1990&#x27;s        FAMILY HISTORY:  FH: polycystic kidney         Allergies    No Known Allergies    Intolerances        ----  MEDICATIONS  (STANDING):  amLODIPine   Tablet 5 milliGRAM(s) Oral daily  apixaban 5 milliGRAM(s) Oral every 12 hours  levothyroxine 88 MICROGram(s) Oral daily  metoprolol succinate ER 25 milliGRAM(s) Oral daily  polyethylene glycol 3350 17 Gram(s) Oral daily  senna 2 Tablet(s) Oral at bedtime  sodium chloride 0.45%. 1000 milliLiter(s) (50 mL/Hr) IV Continuous <Continuous>    MEDICATIONS  (PRN):  acetaminophen   Tablet .. 650 milliGRAM(s) Oral every 4 hours PRN Temp greater or equal to 38.5C (101.3F)  magnesium hydroxide Suspension 30 milliLiter(s) Oral daily PRN Constipation      ----  REVIEW OF SYSTEMS:  CONSTITUTIONAL: denies fever, chills, fatigue, weakness  HEENT: denies blurred vision, sore throat  SKIN: denies new lesions, rash  CARDIOVASCULAR: denies chest pain, chest pressure, palpitations  RESPIRATORY: denies shortness of breath, sputum production  GASTROINTESTINAL: denies nausea, vomiting, diarrhea, abdominal pain  GENITOURINARY: denies dysuria, discharge  NEUROLOGICAL: denies numbness, headache, focal weakness  MUSCULOSKELETAL: denies new joint pain, muscle aches    ----  PHYSICAL EXAM:  GENERAL: patient appears well, no acute distress, appropriately interactive  EYES: sclera clear, no exudates  ENMT:  moist mucous membranes  NECK: supple, soft, no thyromegaly noted  LUNGS: clear to auscultation, no wheezing or rhonchi appreciated  HEART: soft S1/S2, RRR, no murmurs noted, no noted edema to b/l LE  GASTROINTESTINAL: abdomen is soft, nontender, nondistended, normoactive bowel sounds, no palpable masses  INTEGUMENT:  no visualized rashes, no jaundice noted  MUSCULOSKELETAL: no clubbing or cyanosis, no obvious deformity  NEUROLOGIC: awake, alert, oriented x3, no obvious sensory deficits  ----    T(C): 36.7 (02-09-21 @ 08:47), Max: 36.8 (02-09-21 @ 05:15)  HR: 88 (02-09-21 @ 08:47) (76 - 88)  BP: 128/92 (02-09-21 @ 08:47) (128/92 - 134/93)  RR: 18 (02-09-21 @ 08:47) (18 - 18)  SpO2: 91% (02-09-21 @ 08:47) (90% - 91%)  Wt(kg): --    ----  I&O's Summary    08 Feb 2021 07:01  -  09 Feb 2021 07:00  --------------------------------------------------------  IN: 560 mL / OUT: 550 mL / NET: 10 mL    09 Feb 2021 07:01  -  09 Feb 2021 15:26  --------------------------------------------------------  IN: 590 mL / OUT: 300 mL / NET: 290 mL        LABS:                        11.1   2.83  )-----------( 336      ( 08 Feb 2021 08:07 )             32.5     02-09    141  |  106  |  35<H>  ----------------------------<  94  4.1   |  29  |  1.70<H>    Ca    9.1      09 Feb 2021 12:04    TPro  6.0  /  Alb  2.0<L>  /  TBili  0.5  /  DBili  x   /  AST  25  /  ALT  18  /  AlkPhos  66  02-08        CAPILLARY BLOOD GLUCOSE          02-05 @ 21:04   No growth  --  --

## 2021-02-12 ENCOUNTER — TRANSCRIPTION ENCOUNTER (OUTPATIENT)
Age: 76
End: 2021-02-12

## 2022-09-29 NOTE — ED ADULT NURSE NOTE - NSFALLRSKINDICTYPE_ED_ALL_ED
Impression: Hypermetropia, bilateral: H52.03. Plan: Discussed with patient rx minimally different, also recommended holding on rx if pt would like to go for refractive consult but pt would like copy of rx today. Released per pt request. Also discussed limited benefit of refractive procedure with hyperopic rx but pt would like to proceed with consult for full options. Impaired Gait

## 2024-02-27 NOTE — PHYSICAL THERAPY INITIAL EVALUATION ADULT - GAIT TRAINING, PT EVAL
Patient calling to speak with nurse regarding post-op care. Please call patient back at [phone].    STG (3-5 sessions) amb 100 feet without device independent

## 2024-05-18 ENCOUNTER — INPATIENT (INPATIENT)
Facility: HOSPITAL | Age: 79
LOS: 1 days | Discharge: HOME | DRG: 185 | End: 2024-05-20
Attending: SURGERY | Admitting: SURGERY
Payer: COMMERCIAL

## 2024-05-18 VITALS
TEMPERATURE: 98 F | HEIGHT: 66 IN | HEART RATE: 60 BPM | RESPIRATION RATE: 20 BRPM | OXYGEN SATURATION: 98 % | SYSTOLIC BLOOD PRESSURE: 204 MMHG | WEIGHT: 160.06 LBS | DIASTOLIC BLOOD PRESSURE: 117 MMHG

## 2024-05-18 DIAGNOSIS — S22.42XA MULTIPLE FRACTURES OF RIBS, LEFT SIDE, INITIAL ENCOUNTER FOR CLOSED FRACTURE: ICD-10-CM

## 2024-05-18 LAB
ALBUMIN SERPL ELPH-MCNC: 3.3 G/DL — SIGNIFICANT CHANGE UP (ref 3.3–5)
ALBUMIN SERPL ELPH-MCNC: 4.4 G/DL — SIGNIFICANT CHANGE UP (ref 3.3–5)
ALP SERPL-CCNC: 42 U/L — SIGNIFICANT CHANGE UP (ref 40–120)
ALP SERPL-CCNC: 59 U/L — SIGNIFICANT CHANGE UP (ref 40–120)
ALT FLD-CCNC: 10 U/L — SIGNIFICANT CHANGE UP (ref 10–45)
ALT FLD-CCNC: 9 U/L — LOW (ref 10–45)
ANION GAP SERPL CALC-SCNC: 12 MMOL/L — SIGNIFICANT CHANGE UP (ref 5–17)
ANION GAP SERPL CALC-SCNC: 8 MMOL/L — SIGNIFICANT CHANGE UP (ref 5–17)
APTT BLD: 30.4 SEC — SIGNIFICANT CHANGE UP (ref 24.5–35.6)
AST SERPL-CCNC: 15 U/L — SIGNIFICANT CHANGE UP (ref 10–40)
AST SERPL-CCNC: 20 U/L — SIGNIFICANT CHANGE UP (ref 10–40)
BASE EXCESS BLDV CALC-SCNC: 0 MMOL/L — SIGNIFICANT CHANGE UP (ref -2–3)
BASOPHILS # BLD AUTO: 0 K/UL — SIGNIFICANT CHANGE UP (ref 0–0.2)
BASOPHILS NFR BLD AUTO: 0 % — SIGNIFICANT CHANGE UP (ref 0–2)
BILIRUB SERPL-MCNC: 0.5 MG/DL — SIGNIFICANT CHANGE UP (ref 0.2–1.2)
BILIRUB SERPL-MCNC: 0.6 MG/DL — SIGNIFICANT CHANGE UP (ref 0.2–1.2)
BUN SERPL-MCNC: 35 MG/DL — HIGH (ref 7–23)
BUN SERPL-MCNC: 44 MG/DL — HIGH (ref 7–23)
CA-I SERPL-SCNC: 1.28 MMOL/L — SIGNIFICANT CHANGE UP (ref 1.15–1.33)
CALCIUM SERPL-MCNC: 10.4 MG/DL — SIGNIFICANT CHANGE UP (ref 8.4–10.5)
CALCIUM SERPL-MCNC: 7.4 MG/DL — LOW (ref 8.4–10.5)
CHLORIDE BLDV-SCNC: 102 MMOL/L — SIGNIFICANT CHANGE UP (ref 96–108)
CHLORIDE SERPL-SCNC: 105 MMOL/L — SIGNIFICANT CHANGE UP (ref 96–108)
CHLORIDE SERPL-SCNC: 114 MMOL/L — HIGH (ref 96–108)
CO2 BLDV-SCNC: 30 MMOL/L — HIGH (ref 22–26)
CO2 SERPL-SCNC: 20 MMOL/L — LOW (ref 22–31)
CO2 SERPL-SCNC: 23 MMOL/L — SIGNIFICANT CHANGE UP (ref 22–31)
CREAT SERPL-MCNC: 1.55 MG/DL — HIGH (ref 0.5–1.3)
CREAT SERPL-MCNC: 2.1 MG/DL — HIGH (ref 0.5–1.3)
DACRYOCYTES BLD QL SMEAR: SLIGHT — SIGNIFICANT CHANGE UP
EGFR: 32 ML/MIN/1.73M2 — LOW
EGFR: 46 ML/MIN/1.73M2 — LOW
ELLIPTOCYTES BLD QL SMEAR: SLIGHT — SIGNIFICANT CHANGE UP
EOSINOPHIL # BLD AUTO: 0 K/UL — SIGNIFICANT CHANGE UP (ref 0–0.5)
EOSINOPHIL NFR BLD AUTO: 0 % — SIGNIFICANT CHANGE UP (ref 0–6)
GAS PNL BLDV: 135 MMOL/L — LOW (ref 136–145)
GAS PNL BLDV: SIGNIFICANT CHANGE UP
GAS PNL BLDV: SIGNIFICANT CHANGE UP
GIANT PLATELETS BLD QL SMEAR: PRESENT — SIGNIFICANT CHANGE UP
GLUCOSE BLDV-MCNC: 133 MG/DL — HIGH (ref 70–99)
GLUCOSE SERPL-MCNC: 111 MG/DL — HIGH (ref 70–99)
GLUCOSE SERPL-MCNC: 138 MG/DL — HIGH (ref 70–99)
HCO3 BLDV-SCNC: 28 MMOL/L — SIGNIFICANT CHANGE UP (ref 22–29)
HCT VFR BLD CALC: 45.3 % — SIGNIFICANT CHANGE UP (ref 39–50)
HCT VFR BLDA CALC: 44 % — SIGNIFICANT CHANGE UP (ref 39–51)
HGB BLD CALC-MCNC: 14.7 G/DL — SIGNIFICANT CHANGE UP (ref 12.6–17.4)
HGB BLD-MCNC: 14.4 G/DL — SIGNIFICANT CHANGE UP (ref 13–17)
INR BLD: 1.07 RATIO — SIGNIFICANT CHANGE UP (ref 0.85–1.18)
LACTATE BLDV-MCNC: 1.7 MMOL/L — SIGNIFICANT CHANGE UP (ref 0.5–2)
LYMPHOCYTES # BLD AUTO: 0.39 K/UL — LOW (ref 1–3.3)
LYMPHOCYTES # BLD AUTO: 3.5 % — LOW (ref 13–44)
MANUAL SMEAR VERIFICATION: SIGNIFICANT CHANGE UP
MCHC RBC-ENTMCNC: 30.4 PG — SIGNIFICANT CHANGE UP (ref 27–34)
MCHC RBC-ENTMCNC: 31.8 GM/DL — LOW (ref 32–36)
MCV RBC AUTO: 95.6 FL — SIGNIFICANT CHANGE UP (ref 80–100)
MONOCYTES # BLD AUTO: 0.39 K/UL — SIGNIFICANT CHANGE UP (ref 0–0.9)
MONOCYTES NFR BLD AUTO: 3.5 % — SIGNIFICANT CHANGE UP (ref 2–14)
NEUTROPHILS # BLD AUTO: 10.47 K/UL — HIGH (ref 1.8–7.4)
NEUTROPHILS NFR BLD AUTO: 91.2 % — HIGH (ref 43–77)
NEUTS BAND # BLD: 1.8 % — SIGNIFICANT CHANGE UP (ref 0–8)
PCO2 BLDV: 60 MMHG — HIGH (ref 42–55)
PH BLDV: 7.28 — LOW (ref 7.32–7.43)
PLAT MORPH BLD: NORMAL — SIGNIFICANT CHANGE UP
PLATELET # BLD AUTO: 155 K/UL — SIGNIFICANT CHANGE UP (ref 150–400)
PO2 BLDV: 24 MMHG — LOW (ref 25–45)
POIKILOCYTOSIS BLD QL AUTO: SLIGHT — SIGNIFICANT CHANGE UP
POTASSIUM BLDV-SCNC: 6.9 MMOL/L — CRITICAL HIGH (ref 3.5–5.1)
POTASSIUM SERPL-MCNC: 3.6 MMOL/L — SIGNIFICANT CHANGE UP (ref 3.5–5.3)
POTASSIUM SERPL-MCNC: 6.2 MMOL/L — CRITICAL HIGH (ref 3.5–5.3)
POTASSIUM SERPL-SCNC: 3.6 MMOL/L — SIGNIFICANT CHANGE UP (ref 3.5–5.3)
POTASSIUM SERPL-SCNC: 6.2 MMOL/L — CRITICAL HIGH (ref 3.5–5.3)
PROT SERPL-MCNC: 5.2 G/DL — LOW (ref 6–8.3)
PROT SERPL-MCNC: 7.5 G/DL — SIGNIFICANT CHANGE UP (ref 6–8.3)
PROTHROM AB SERPL-ACNC: 11.2 SEC — SIGNIFICANT CHANGE UP (ref 9.5–13)
RBC # BLD: 4.74 M/UL — SIGNIFICANT CHANGE UP (ref 4.2–5.8)
RBC # FLD: 14 % — SIGNIFICANT CHANGE UP (ref 10.3–14.5)
RBC BLD AUTO: ABNORMAL
SAO2 % BLDV: 26.5 % — LOW (ref 67–88)
SCHISTOCYTES BLD QL AUTO: SLIGHT — SIGNIFICANT CHANGE UP
SODIUM SERPL-SCNC: 140 MMOL/L — SIGNIFICANT CHANGE UP (ref 135–145)
SODIUM SERPL-SCNC: 142 MMOL/L — SIGNIFICANT CHANGE UP (ref 135–145)
WBC # BLD: 11.26 K/UL — HIGH (ref 3.8–10.5)
WBC # FLD AUTO: 11.26 K/UL — HIGH (ref 3.8–10.5)

## 2024-05-18 PROCEDURE — 71250 CT THORAX DX C-: CPT | Mod: 26,MC

## 2024-05-18 PROCEDURE — 99291 CRITICAL CARE FIRST HOUR: CPT

## 2024-05-18 PROCEDURE — 72125 CT NECK SPINE W/O DYE: CPT | Mod: 26,MC

## 2024-05-18 PROCEDURE — 72131 CT LUMBAR SPINE W/O DYE: CPT | Mod: 26,MC

## 2024-05-18 PROCEDURE — 72128 CT CHEST SPINE W/O DYE: CPT | Mod: 26,MC

## 2024-05-18 PROCEDURE — 99222 1ST HOSP IP/OBS MODERATE 55: CPT

## 2024-05-18 PROCEDURE — 73030 X-RAY EXAM OF SHOULDER: CPT | Mod: 26,LT

## 2024-05-18 PROCEDURE — 70450 CT HEAD/BRAIN W/O DYE: CPT | Mod: 26,MC

## 2024-05-18 PROCEDURE — 74176 CT ABD & PELVIS W/O CONTRAST: CPT | Mod: 26,MC

## 2024-05-18 PROCEDURE — 73010 X-RAY EXAM OF SHOULDER BLADE: CPT | Mod: 26

## 2024-05-18 RX ORDER — ACETAMINOPHEN 500 MG
1000 TABLET ORAL ONCE
Refills: 0 | Status: COMPLETED | OUTPATIENT
Start: 2024-05-18 | End: 2024-05-18

## 2024-05-18 RX ORDER — LIDOCAINE 4 G/100G
1 CREAM TOPICAL EVERY 24 HOURS
Refills: 0 | Status: DISCONTINUED | OUTPATIENT
Start: 2024-05-18 | End: 2024-05-20

## 2024-05-18 RX ORDER — POLYETHYLENE GLYCOL 3350 17 G/17G
17 POWDER, FOR SOLUTION ORAL EVERY 12 HOURS
Refills: 0 | Status: DISCONTINUED | OUTPATIENT
Start: 2024-05-18 | End: 2024-05-20

## 2024-05-18 RX ORDER — DEXTROSE 50 % IN WATER 50 %
25 SYRINGE (ML) INTRAVENOUS ONCE
Refills: 0 | Status: COMPLETED | OUTPATIENT
Start: 2024-05-18 | End: 2024-05-18

## 2024-05-18 RX ORDER — HYDROMORPHONE HYDROCHLORIDE 2 MG/ML
0.2 INJECTION INTRAMUSCULAR; INTRAVENOUS; SUBCUTANEOUS ONCE
Refills: 0 | Status: DISCONTINUED | OUTPATIENT
Start: 2024-05-18 | End: 2024-05-18

## 2024-05-18 RX ORDER — SODIUM BICARBONATE 1 MEQ/ML
50 SYRINGE (ML) INTRAVENOUS ONCE
Refills: 0 | Status: COMPLETED | OUTPATIENT
Start: 2024-05-18 | End: 2024-05-18

## 2024-05-18 RX ORDER — ACETAMINOPHEN 500 MG
1000 TABLET ORAL EVERY 6 HOURS
Refills: 0 | Status: DISCONTINUED | OUTPATIENT
Start: 2024-05-18 | End: 2024-05-19

## 2024-05-18 RX ORDER — LIDOCAINE 4 G/100G
1 CREAM TOPICAL ONCE
Refills: 0 | Status: COMPLETED | OUTPATIENT
Start: 2024-05-18 | End: 2024-05-18

## 2024-05-18 RX ORDER — ENOXAPARIN SODIUM 100 MG/ML
40 INJECTION SUBCUTANEOUS
Refills: 0 | Status: DISCONTINUED | OUTPATIENT
Start: 2024-05-18 | End: 2024-05-20

## 2024-05-18 RX ORDER — OXYCODONE HYDROCHLORIDE 5 MG/1
5 TABLET ORAL
Refills: 0 | Status: DISCONTINUED | OUTPATIENT
Start: 2024-05-18 | End: 2024-05-19

## 2024-05-18 RX ORDER — SODIUM CHLORIDE 9 MG/ML
1000 INJECTION INTRAMUSCULAR; INTRAVENOUS; SUBCUTANEOUS ONCE
Refills: 0 | Status: COMPLETED | OUTPATIENT
Start: 2024-05-18 | End: 2024-05-18

## 2024-05-18 RX ORDER — INSULIN HUMAN 100 [IU]/ML
5 INJECTION, SOLUTION SUBCUTANEOUS ONCE
Refills: 0 | Status: COMPLETED | OUTPATIENT
Start: 2024-05-18 | End: 2024-05-18

## 2024-05-18 RX ORDER — OXYCODONE HYDROCHLORIDE 5 MG/1
2.5 TABLET ORAL
Refills: 0 | Status: DISCONTINUED | OUTPATIENT
Start: 2024-05-18 | End: 2024-05-19

## 2024-05-18 RX ORDER — HYDROMORPHONE HYDROCHLORIDE 2 MG/ML
0.25 INJECTION INTRAMUSCULAR; INTRAVENOUS; SUBCUTANEOUS ONCE
Refills: 0 | Status: DISCONTINUED | OUTPATIENT
Start: 2024-05-18 | End: 2024-05-19

## 2024-05-18 RX ORDER — CHLORHEXIDINE GLUCONATE 213 G/1000ML
1 SOLUTION TOPICAL
Refills: 0 | Status: DISCONTINUED | OUTPATIENT
Start: 2024-05-18 | End: 2024-05-20

## 2024-05-18 RX ORDER — SENNA PLUS 8.6 MG/1
2 TABLET ORAL AT BEDTIME
Refills: 0 | Status: DISCONTINUED | OUTPATIENT
Start: 2024-05-18 | End: 2024-05-20

## 2024-05-18 RX ADMIN — LIDOCAINE 1 PATCH: 4 CREAM TOPICAL at 17:38

## 2024-05-18 RX ADMIN — OXYCODONE HYDROCHLORIDE 5 MILLIGRAM(S): 5 TABLET ORAL at 23:56

## 2024-05-18 RX ADMIN — Medication 400 MILLIGRAM(S): at 17:37

## 2024-05-18 RX ADMIN — SODIUM CHLORIDE 1000 MILLILITER(S): 9 INJECTION INTRAMUSCULAR; INTRAVENOUS; SUBCUTANEOUS at 18:54

## 2024-05-18 RX ADMIN — INSULIN HUMAN 5 UNIT(S): 100 INJECTION, SOLUTION SUBCUTANEOUS at 18:56

## 2024-05-18 RX ADMIN — Medication 1000 MILLIGRAM(S): at 19:08

## 2024-05-18 RX ADMIN — Medication 25 GRAM(S): at 18:54

## 2024-05-18 RX ADMIN — Medication 50 MILLIEQUIVALENT(S): at 18:54

## 2024-05-18 RX ADMIN — LIDOCAINE 1 PATCH: 4 CREAM TOPICAL at 06:54

## 2024-05-18 NOTE — CONSULT NOTE ADULT - SUBJECTIVE AND OBJECTIVE BOX
HPI  79yMale w/ afib (no AC or ASA), polycystic kidney diease, PPM (placed in 2019), HTN, Hypothyroidism, IBS, SHRUTHI present to ED c/o L shoulder pain s/p mechanical fall. Patient with positive headstrike but denies LOC. Denies numbness/tingling in the LUE. Denies any other trauma/injuries at this time.    ROS  Negative unless otherwise specified in HPI.    PAST MEDICAL & SURGICAL Hx  PAST MEDICAL & SURGICAL HISTORY:  HTN (hypertension)  10 years      Hypothyroidism  last tft's - 1 month ago      Erectile dysfunction  takes viagra prn      IBS (irritable bowel syndrome)      Osteoarthritis      Bulging disc  cervical spine 10 years ago - deneies any radicular pain      Hemorrhoids without mention of complications      Polycystic kidney  vague history unsure as to diagnosis, states "functioning normal "      History of appendectomy  1990's      Lipoma  multiple  times - last 10 years from neck and thigh in past      Ulnar nerve entrapment  right wrist decompression - 2000      Hernia NEC  repair approximately 10 years ago          MEDICATIONS  Home Medications:  acetaminophen 325 mg oral tablet: 2 tab(s) orally every 4 hours, As needed, Temp greater or equal to 38.5C (101.3F) (10 Feb 2021 10:12)  amLODIPine 5 mg oral tablet: 1 tab(s) orally once a day (06 Feb 2021 01:39)  Eliquis 5 mg oral tablet: 1 tab(s) orally 2 times a day (06 Feb 2021 01:39)  levothyroxine 100 mcg (0.1 mg) oral tablet: 1 tab(s) orally once a day (06 Feb 2021 01:39)  polyethylene glycol 3350 oral powder for reconstitution: 17 gram(s) orally once a day as needed for constipation  (10 Feb 2021 10:13)  senna oral tablet: 2 tab(s) orally once a day (at bedtime) as needed for constipation  (10 Feb 2021 10:13)  Toprol-XL 25 mg oral tablet, extended release: 1 tab(s) orally once a day (06 Feb 2021 01:39)      ALLERGIES  No Known Allergies      FAMILY Hx  FAMILY HISTORY:  FH: polycystic kidney        SOCIAL Hx  Social History:      VITALS  Vital Signs Last 24 Hrs  T(C): 37.3 (18 May 2024 18:48), Max: 37.3 (18 May 2024 18:48)  T(F): 99.1 (18 May 2024 18:48), Max: 99.1 (18 May 2024 18:48)  HR: 62 (18 May 2024 18:48) (60 - 62)  BP: 182/102 (18 May 2024 18:48) (174/106 - 204/117)  BP(mean): --  RR: 18 (18 May 2024 18:48) (16 - 20)  SpO2: 96% (18 May 2024 18:48) (96% - 98%)    Parameters below as of 18 May 2024 18:48  Patient On (Oxygen Delivery Method): room air        PHYSICAL EXAM  Gen: Lying in bed, NAD  Resp: No increased WOB  LUE:  Skin intact, No edema or ecchymosis over back/shoulder  +TTP over scapula, no TTP along remainder of extremity; compartments soft  Limited ROM at shoulder 2/2 pain  Motor: Ax/Musc/Med/Rad/AIN/PIN/U intact  Sensory: Ax/Musc/Med/Rad/U SILT  +Rad pulse, WWP    Secondary survey:  No TTP along spine or other extremities, pelvis grossly stable, SILT and soft compartments throughout    LABS                        14.4   11.26 )-----------( 155      ( 18 May 2024 17:52 )             45.3     05-18    142  |  114<H>  |  35<H>  ----------------------------<  111<H>  3.6   |  20<L>  |  1.55<H>    Ca    7.4<L>      18 May 2024 20:30    TPro  5.2<L>  /  Alb  3.3  /  TBili  0.5  /  DBili  x   /  AST  15  /  ALT  9<L>  /  AlkPhos  42  05-18    PT/INR - ( 18 May 2024 17:52 )   PT: 11.2 sec;   INR: 1.07 ratio         PTT - ( 18 May 2024 17:52 )  PTT:30.4 sec    IMAGING  XRs: L scapula fx (personal read)    ASSESSMENT & PLAN  79yMale w/ L scapula fx s/p immobilization.  -NWB LUE in a sling  -pain control  -ice/cold compress  -no acute ortho surgery at this time  -f/u outpt with Dr. Ramirez in 1 week after discharge, call office for appt HPI  79yMale w/ afib (no AC or ASA), polycystic kidney diease, PPM (placed in 2019), HTN, Hypothyroidism, IBS, SHRUTHI present to ED c/o L shoulder pain s/p mechanical fall. Patient with positive headstrike but denies LOC. Denies numbness/tingling in the LUE. Denies any other trauma/injuries at this time.    ROS  Negative unless otherwise specified in HPI.    PAST MEDICAL & SURGICAL Hx  PAST MEDICAL & SURGICAL HISTORY:  HTN (hypertension)  10 years  Hypothyroidism  last tft's - 1 month ago  Erectile dysfunction  takes viagra prn  IBS (irritable bowel syndrome)  Osteoarthritis  Bulging disc  cervical spine 10 years ago - deneies any radicular pain  Hemorrhoids without mention of complications  Polycystic kidney  vague history unsure as to diagnosis, states "functioning normal "  History of appendectomy  1990's  Lipoma  multiple  times - last 10 years from neck and thigh in past  Ulnar nerve entrapment  right wrist decompression - 2000  Hernia NEC  repair approximately 10 years ago    MEDICATIONS  Home Medications:  acetaminophen 325 mg oral tablet: 2 tab(s) orally every 4 hours, As needed, Temp greater or equal to 38.5C (101.3F) (10 Feb 2021 10:12)  amLODIPine 5 mg oral tablet: 1 tab(s) orally once a day (06 Feb 2021 01:39)  Eliquis 5 mg oral tablet: 1 tab(s) orally 2 times a day (06 Feb 2021 01:39)  levothyroxine 100 mcg (0.1 mg) oral tablet: 1 tab(s) orally once a day (06 Feb 2021 01:39)  polyethylene glycol 3350 oral powder for reconstitution: 17 gram(s) orally once a day as needed for constipation  (10 Feb 2021 10:13)  senna oral tablet: 2 tab(s) orally once a day (at bedtime) as needed for constipation  (10 Feb 2021 10:13)  Toprol-XL 25 mg oral tablet, extended release: 1 tab(s) orally once a day (06 Feb 2021 01:39)    ALLERGIES  No Known Allergies    FAMILY Hx  FAMILY HISTORY:  FH: polycystic kidney    VITALS  Vital Signs Last 24 Hrs  T(C): 37.3 (18 May 2024 18:48), Max: 37.3 (18 May 2024 18:48)  T(F): 99.1 (18 May 2024 18:48), Max: 99.1 (18 May 2024 18:48)  HR: 62 (18 May 2024 18:48) (60 - 62)  BP: 182/102 (18 May 2024 18:48) (174/106 - 204/117)  RR: 18 (18 May 2024 18:48) (16 - 20)  SpO2: 96% (18 May 2024 18:48) (96% - 98%)  Parameters below as of 18 May 2024 18:48  Patient On (Oxygen Delivery Method): room air    PHYSICAL EXAM  Gen: Lying in bed, NAD  Resp: No increased WOB  LUE:  Skin intact, no edema or ecchymosis over back/shoulder  +TTP over scapula, no TTP along remainder of extremity; compartments soft  Limited ROM at shoulder 2/2 pain  Motor: Ax/Musc/Med/Rad/AIN/PIN/U intact  Sensory: Ax/Musc/Med/Rad/U SILT  +Rad pulse, WWP    Secondary survey:  No TTP along other extremities, pelvis grossly stable, SILT and soft compartments throughout    LABS                        14.4   11.26 )-----------( 155      ( 18 May 2024 17:52 )             45.3     05-18    142  |  114<H>  |  35<H>  ----------------------------<  111<H>  3.6   |  20<L>  |  1.55<H>    Ca    7.4<L>      18 May 2024 20:30    TPro  5.2<L>  /  Alb  3.3  /  TBili  0.5  /  DBili  x   /  AST  15  /  ALT  9<L>  /  AlkPhos  42  05-18    PT/INR - ( 18 May 2024 17:52 )   PT: 11.2 sec;   INR: 1.07 ratio    PTT - ( 18 May 2024 17:52 )  PTT:30.4 sec    IMAGING  ACC: [id number] EXAM:  CT ABDOMEN AND PELVIS   ORDERED BY: VIJAY VANG     ACC: [id number] EXAM:  CT CHEST   ORDERED BY: VIJAY VANG     PROCEDURE DATE:  05/18/2024      INTERPRETATION:  CLINICAL INFORMATION: Status post fall, left-sided rib   and scapular pain    COMPARISON: Renal ultrasound 2/6/2021.    CONTRAST/COMPLICATIONS:  IV Contrast: NONE  Oral Contrast: NONE  Complications: None reported at time of study completion    PROCEDURE:  CT of the Chest, Abdomen and Pelvis was performed.  Sagittal and coronal reformats were performed.    FINDINGS:  CHEST:  LUNGS AND LARGE AIRWAYS: Patent central airways. Groundglass opacities in   the bilateral lower lobes, left greater than right, likely representing   subsegmental atelectasis.  PLEURA: Small left pleural hemothorax. Trace left apical pneumothorax   with additional locules of air within the pleural fluid. Mild airspace   opacity in the lung apex and left lower lobe likely representing lung   contusion. Findings are compatible with a hemopneumothorax.  VESSELS: Within normal limits.  HEART: Cardiomegaly. Left chest wall dual-lead pacemaker with leads in   the right atrium and right ventricle. No pericardial effusion.  MEDIASTINUM AND JUAN: No lymphadenopathy.  CHEST WALL AND LOWER NECK: Acute displaced fractures of the left   posterior 4th, 5th and 6th ribs.    ABDOMEN AND PELVIS:  LIVER: Within normal limits.  BILE DUCTS: Normal caliber.  GALLBLADDER: Within normal limits.  SPLEEN: Within normal limits.  PANCREAS: Within normallimits.  ADRENALS: Within normal limits.  KIDNEYS/URETERS: Innumerable large bilateral cysts, compatible with   history of polycystic kidney disease.    BLADDER: Within normal limits.  REPRODUCTIVE ORGANS: Prostate is enlarged.    BOWEL: No bowel obstruction. Appendix is not visualized. No evidence of   inflammation in the pericecal region. Small hiatal hernia. Colonic   diverticulosis without diverticulitis.  PERITONEUM: No ascites.  VESSELS: Atherosclerotic changes.  RETROPERITONEUM/LYMPH NODES: No lymphadenopathy.  ABDOMINAL WALL: Within normal limits.  BONES: Degenerative changes. Acute displaced fractures of the left   posterior 4th, 5th and 6th ribs.  There is comminuted fracture of the left scapula. There is an associated   hematoma.    IMPRESSION:  Acute displaced fractures of the left posterior 4th, 5th and 6th ribs.    There is comminuted fracture of the left scapula. There is an associated   hematoma.    Small left pleural hemothorax. Trace left apical pneumothorax with   additional locules of air within the pleural fluid. Mild airspace opacity   in the lung apex and left lower lobe likely representing lung contusion.   Findings are compatible with a hemopneumothorax.    No evidence of abdominal or pelvic trauma.    --- End of Report ---      ASSESSMENT & PLAN  79yMale w/ L scapula fx s/p immobilization.  -NWB LUE in a sling  -pain control  -ice/cold compress  -no acute ortho surgery at this time  -f/u outpt with Dr. Ramirez in 1 week after discharge, call office for appt

## 2024-05-18 NOTE — ED PROVIDER NOTE - PHYSICAL EXAMINATION
NAD. A&Ox3. Hypertensive. Afebrile. +PERRL, EOMI. No facial or scalp tender. Lungs clear. No spinal midline tender. +Left scapular tender and swelling. +Generalized left shoulder tender with diminished ROMs. +left hand/finger purplish without tender (chronic issues +2years, f/ued DrAll and unknown cause). No mid chest tender. +Left lateral/post rib tender. ABD soft, non tender. No hip tender. +Move all extremities.

## 2024-05-18 NOTE — ED ADULT TRIAGE NOTE - CHIEF COMPLAINT QUOTE
trip and fall, fell backwards, positive head strike, endorse left shoulder pain  denies blood thinners, no loc

## 2024-05-18 NOTE — ED ADULT NURSE NOTE - TEMPLATE
Physical Therapy  Physical Therapy Initial Assessment     Name: Oswaldo Montero  : 1964  MRN: [id number]      Date of Service: 2023    Evaluating PT:  Palma Hatchet, PT, DPT [id number]    Room #:  2036/8909-C  Diagnosis:  Stroke-like episode [R29.90]  PMHx/PSHx:    Past Medical History:   Diagnosis Date    Asthma     CAD (coronary artery disease)      had MI per patient  / follows with Dr. Avery Taylor    CVA (cerebral vascular accident) [facility]) 10/28/2022    Environmental allergies     Fracture     right 5th metacarpal    Heart palpitations     Hypertension     Kidney infection 2022    MI (myocardial infarction) [facility])     Syncope and collapse      Procedure/Surgery:   diagnostic angiogram, TNK  Precautions:  Falls, RUE weakness, sling RUE for comfort  Equipment Needs:  None    SUBJECTIVE:    Pt lives with fiance in a 1 story home with 2 stairs to enter and no rail. Pt ambulated without device and was independent PTA. OBJECTIVE:   Initial Evaluation  Date: 23 Treatment Short Term/ Long Term   Goals   AM-PAC 6 Clicks 92/66     Was pt agreeable to Eval/treatment? Yes     Does pt have pain?  6/10 R shoulder pain     Bed Mobility  Rolling: NT  Supine to sit: SBA  Sit to supine: SBA  Scooting: SBA  Mod Independent   Transfers Sit to stand: SBA  Stand to sit: SBA  Stand pivot: CGA no device  Independent   Ambulation   400 feet with CGA no device  >400 feet Independently   Stair negotiation: ascended and descended NT  >10 steps with 1 rail Mod Independent   ROM BUE:  Defer to OT note  BLE:  WFL     Strength BUE:  Defer to OT note  BLE:  4+/5  Increase by 1/3 MMT grade   Balance Sitting EOB:  Independent  Dynamic Standing:  CGA no device  Sitting EOB:  -  Dynamic Standing:  Independent     Pt is A & O x 4  CAM-ICU: NT  RASS: 0  Sensation:  paresthesias distal to R elbow  Edema:  none    Vitals:  Heart Rate at rest 80 bpm Heart Rate post session 81 bpm   SpO2 at rest -% SpO2 post session -%
Blood Pressure at rest 153/96 mmHg Blood Pressure post session - mmHg       Functional Status Score-Intensive Care Unit (FSS-ICU)   Rolling -/7   Supine to sit transfer 5/7   Unsupported sitting  5/7   Sit to stand transfers 4/7   Ambulation 4/7   Total  18/35       Therapeutic Exercises:  NA    Patient education  Pt educated on safety    Patient response to education:   Pt verbalized understanding Pt demonstrated skill Pt requires further education in this area   x x x     ASSESSMENT:    Conditions Requiring Skilled Therapeutic Intervention:    [x]Decreased strength     [x]Decreased ROM  [x]Decreased functional mobility  []Decreased balance   []Decreased endurance   []Decreased posture  [x]Decreased sensation  []Decreased coordination   []Decreased vision  []Decreased safety awareness   [x]Increased pain       Comments:  RN reported pt was medically stable. Pt was in bed upon arrival, agreeable to initial evaluation. Pt's fiance was present for session. RUE weakness noted with mobility. Pt ambulated with minor unsteadiness at times but no LOBs. Fatigue limited activity. Pt was left in bed, per request, with all needs met and call light in reach. All lines remained intact. RN notified of pt's need for arm sling. Treatment:  Patient practiced and was instructed in the following treatment:    Bed mobility training - pt given verbal cues to facilitate proper sequencing and safety during supine>sit. Sitting EOB for >3 minutes for upright tolerance, postural awareness and BLE ROM  Transfer training - pt was given verbal and tactile cues to facilitate proper hand placement, technique and safety during sit to stand, stand to sit and stand pivot transfers. Gait training- pt was given verbal and tactile cues to facilitate safety during ambulation. Pt's/ family goals   1. Return home    Prognosis is good for reaching above PT goals.     Patient and or family understand(s) diagnosis, prognosis, and plan of
care.  [x] Yes [] No     PHYSICAL THERAPY PLAN OF CARE:    PT POC is established based on physician order and patient diagnosis     Referring provider/PT Order:  Roverto Russell MD /01/18/23 1030 PT eval and treat  Diagnosis:  Stroke-like episode [R29.90]  Specific instructions for next treatment:  Progress activity    Current Treatment Recommendations:     [x] Strengthening to improve independence with functional mobility   [] ROM to improve independence with functional mobility   [x] Balance Training to improve static/dynamic balance and to reduce fall risk  [x] Endurance Training to improve activity tolerance during functional mobility   [x] Transfer Training to improve safety and independence with all functional transfers   [x] Gait Training to improve gait mechanics, endurance and asses need for appropriate assistive device  [x] Stair Training in preparation for safe discharge home and/or into the community   [] Positioning to prevent skin breakdown and contractures  [x] Safety and Education Training   [x] Patient/Caregiver Education   [] HEP  [] Other     PT long term treatment goals are located in above grid    Frequency of treatments: 2-5x/week x 1-2 weeks. Time in  1258  Time out  1328    Total Treatment Time  15 minutes     Evaluation Time includes thorough review of current medical information, gathering information on past medical history/social history and prior level of function, completion of standardized testing/informal observation of tasks, assessment of data and education on plan of care and goals.     CPT codes:  [x] Low Complexity PT evaluation 92434  [] Moderate Complexity PT evaluation 26057  [] High Complexity PT evaluation 11979  [] PT Re-evaluation 43611  [] Gait training 39493 - minutes  [] Manual therapy 01733 - minutes  [x] Therapeutic activities 03841 15 minutes  [] Therapeutic exercises 26599 - minutes  [] Neuromuscular reeducation 44889 -- minutes     Marissa Amaral PT,
EBENT  [id number]      
<-- Click to add NO significant Past Surgical History
Fall

## 2024-05-18 NOTE — ED PROVIDER NOTE - OBJECTIVE STATEMENT
80yo male pt with PMH of afib (no AC or ASA), polycystic kidney diease, PPM (placed in 2019), HTN, Hypothyroidism, IBS, SHRUTHI (not on CPAP),  presents to ED with head injury, left upper back pain, left shoulder pain, and left rib pain s/p mechanical fall this 1pm at home. Reports he tripped on uneven floor and fell backward hitting his head on the floor. Denies LOC, headache, dizziness, visual changes or N/V. Denies neck pain. Denies sensory changes or weakness to extremities. Denies CP/SOB/ABD pain. Denies hip pain. Denies fever, chills, or recent sickness.

## 2024-05-18 NOTE — ED ADULT NURSE NOTE - NSFALLUNIVINTERV_ED_ALL_ED
Bed/Stretcher in lowest position, wheels locked, appropriate side rails in place/Call bell, personal items and telephone in reach/Instruct patient to call for assistance before getting out of bed/chair/stretcher/Non-slip footwear applied when patient is off stretcher/[place] to call system/Physically safe environment - no spills, clutter or unnecessary equipment/Purposeful proactive rounding/Room/bathroom lighting operational, light cord in reach

## 2024-05-18 NOTE — H&P ADULT - ASSESSMENT
79 year old male PMH afib (no AC or ASA), polycystic kidney diease, PPM (placed in 2019), HTN, Hypothyroidism, IBS, SHRUTHI (not on CPAP),  presents to ED after mechanical fall +HS/no loc, reporting left chest wall pain and left shoulder pain. Primary intact, secondary confirmed let shoulder and chest wall pain. Imaging with L 4,5,6 displaced rib fx and pulm contusion with small pneumo/hemothorax. Trauma consulted for evaluation.    Plan:  - admit to [facility] Dr. Chauhan  - SICU consult Rib score 1 and pulm contusion  - pulling 1250 on IS  - pain control rib protocol  - requires med rec, does not have list, pharmacy not available at this time ([facility])  - dvt ppx  - care per SICU    Discussed with Dr. Chauhan  Trauma/ACS #[id number]

## 2024-05-18 NOTE — CONSULT NOTE ADULT - ASSESSMENT
79 year old male PMH afib (no AC or ASA), polycystic kidney diease, PPM (placed in 2019), HTN, Hypothyroidism, IBS, SHRUTHI (not on CPAP),  presents to ED after mechanical fall +HS/no loc, reporting left chest wall pain and left shoulder pain. Primary intact, secondary confirmed let shoulder and chest wall pain. Imaging with L 4,5,6 displaced rib fx and pulm contusion with small pneumo/hemothorax. Trauma consulted for evaluation.    PLAN:   Neurologic:   Respiratory:   - Pain medications PRN  - CXR AM  - IS    Cardiovascular:   - MAP goal > 65    Gastrointestinal/Nutrition:   - Regular diet     Renal/Genitourinary:   - Monitor UOP  - Trend electrolytes     Hematologic:   - Mechanical DVT PPx  - Chemical DVT PPx HOLD    Infectious Disease:   - Monitor for signs of infections   - Trend WBCs    Lines/Tubes:  - PIV    Endocrine:   - Trend glucose levels     Disposition:  SICU

## 2024-05-18 NOTE — ED ADULT NURSE NOTE - OBJECTIVE STATEMENT
Patient  is  alert  and  oriented  x4. Color is good  and  skin warm to touch.  He  fell backward  today.  He is  c/o  left shoulder  pain.  He  denies  dizziness or  loss of  consciousness.

## 2024-05-18 NOTE — H&P ADULT - HISTORY OF PRESENT ILLNESS
78yo male pt with PMH of afib (no AC or ASA), polycystic kidney diease, PPM (placed in 2019), HTN, Hypothyroidism, IBS, SHRUTHI (not on CPAP),  presents to ED after mechanical fall at home. Reports left upper back pain, left shoulder pain, and left chest wall pain pain  +HS/No LOC. Reports he tripped on uneven floor and fell backward hitting his head on the floor. Denies  headache, dizziness, visual changes or N/V. Denies neck pain. Denies sensory changes or weakness to extremities. Denies CP/SOB/ABD pain. Denies hip pain. Denies fever, chills, or recent sickness.    In the Ed initially potassium 6.3 on labs s/p shift by ED team now 3.6. Labs otherwise unremarkable. Trauma scans revealed L 4,5,6th diaplced rib fractures, plumonary contusion, small apical pneumo and small hemothorax.

## 2024-05-18 NOTE — H&P ADULT - NSHPPHYSICALEXAM_GEN_ALL_CORE
Primary Survey:   A - airway intact  B - bilateral breath sounds and good chest rise  C - palpable pulses in all extremities  D - GCS 15 on arrival  Exposure obtained      Secondary Survey:  General: NAD  HEENT: Normocephalic, atraumatic, EOMI, PEERLA.  Neck: Soft, midline trachea.  Chest: No chest wall tenderness.   Cardiac: S1, S2, RRR  Respiratory: Bilateral breath sounds, clear and equal bilaterally  Abdomen: Soft, non-distended, non-tender, no rebound, no guarding, no masses palpated  Groin: Normal appearing  Ext: palp radial b/l UE, b/l DP palp in Lower Extrem, motor and sensory grossly intact in all 4 extremities  Back: no TTP, no palpable runoff/stepoff/deformity  Rectal: No nisa blood, LARRY with good tone

## 2024-05-18 NOTE — ED ADULT NURSE REASSESSMENT NOTE - NS ED NURSE REASSESS COMMENT FT1
Received report from ALANIS Soler. Pt is awake, alert, and speaking in full coherent sentences. NAD noted. Pt resting comfortably in stretcher, side rails up and bed in lowest position. Comfort and safety provided, pt awaiting dispo.

## 2024-05-18 NOTE — CONSULT NOTE ADULT - SUBJECTIVE AND OBJECTIVE BOX
SICU Consultation Note  =====================================================   80yo male pt with PMH of afib (no AC or ASA), polycystic kidney diease, PPM (placed in 2019), HTN, Hypothyroidism, IBS, SHRUTHI (not on CPAP),  presents to ED after mechanical fall at home. Reports left upper back pain, left shoulder pain, and left chest wall pain pain  +HS/No LOC. Reports he tripped on uneven floor and fell backward hitting his head on the floor. Denies  headache, dizziness, visual changes or N/V. Denies neck pain. Denies sensory changes or weakness to extremities. Denies CP/SOB/ABD pain. Denies hip pain. Denies fever, chills, or recent sickness.    In the Ed initially potassium 6.3 on labs s/p shift by ED team now 3.6. Labs otherwise unremarkable. Trauma scans revealed L 4,5,6th diaplced rib fractures, plumonary contusion, small apical pneumo and small hemothorax.    (18 May 2024 21:30)    PAST MEDICAL & SURGICAL HISTORY:  HTN (hypertension)  10 years    Hypothyroidism  last tft's - 1 month ago    Erectile dysfunction  takes viagra prn    IBS (irritable bowel syndrome)    Osteoarthritis      Bulging disc  cervical spine 10 years ago - deneies any radicular pain      Hemorrhoids without mention of complications      Polycystic kidney  vague history unsure as to diagnosis, states "functioning normal "      History of appendectomy  1990's      Lipoma  multiple  times - last 10 years from neck and thigh in past      Ulnar nerve entrapment  right wrist decompression - 2000      Hernia NEC  repair approximately 10 years ago        Home Meds: Home Medications:  acetaminophen 325 mg oral tablet: 2 tab(s) orally every 4 hours, As needed, Temp greater or equal to 38.5C (101.3F) (10 Feb 2021 10:12)  amLODIPine 5 mg oral tablet: 1 tab(s) orally once a day (06 Feb 2021 01:39)  Eliquis 5 mg oral tablet: 1 tab(s) orally 2 times a day (06 Feb 2021 01:39)  levothyroxine 100 mcg (0.1 mg) oral tablet: 1 tab(s) orally once a day (06 Feb 2021 01:39)  polyethylene glycol 3350 oral powder for reconstitution: 17 gram(s) orally once a day as needed for constipation  (10 Feb 2021 10:13)  senna oral tablet: 2 tab(s) orally once a day (at bedtime) as needed for constipation  (10 Feb 2021 10:13)  Toprol-XL 25 mg oral tablet, extended release: 1 tab(s) orally once a day (06 Feb 2021 01:39)    Allergies: Allergies    No Known Allergies    Intolerances      Soc:   Advanced Directives: Presumed Full Code     ROS:    REVIEW OF SYSTEMS    [ ] A ten-point review of systems was otherwise negative except as noted.  [ ] Due to altered mental status/intubation, subjective information were not able to be obtained from the patient. History was obtained, to the extent possible, from review of the chart and collateral sources of information.      CURRENT MEDICATIONS:   --------------------------------------------------------------------------------------  Neurologic Medications  HYDROmorphone  Injectable 0.2 milliGRAM(s) IV Push once    Respiratory Medications    Cardiovascular Medications    Gastrointestinal Medications    Genitourinary Medications    Hematologic/Oncologic Medications    Antimicrobial/Immunologic Medications    Endocrine/Metabolic Medications    Topical/Other Medications    --------------------------------------------------------------------------------------    VITAL SIGNS, INS/OUTS (last 24 hours):  --------------------------------------------------------------------------------------  ICU Vital Signs Last 24 Hrs  T(C): 37 (18 May 2024 22:17), Max: 37.3 (18 May 2024 18:48)  T(F): 98.6 (18 May 2024 22:17), Max: 99.1 (18 May 2024 18:48)  HR: 60 (18 May 2024 22:17) (60 - 63)  BP: 192/112 (18 May 2024 22:17) (174/106 - 204/117)  BP(mean): 146 (18 May 2024 22:17) (128 - 146)  ABP: --  ABP(mean): --  RR: 19 (18 May 2024 22:17) (16 - 20)  SpO2: 95% (18 May 2024 22:17) (95% - 98%)    O2 Parameters below as of 18 May 2024 22:17  Patient On (Oxygen Delivery Method): room air          I&O's Summary    --------------------------------------------------------------------------------------    EXAM:  General: NAD  HEENT: Normocephalic, atraumatic, EOMI, PEERLA.  Neck: Soft, midline trachea.  Chest: No chest wall tenderness.   Cardiac: S1, S2, RRR  Respiratory: Bilateral breath sounds, clear and equal bilaterally  Abdomen: Soft, non-distended, non-tender, no rebound, no guarding, no masses palpated  Groin: Normal appearing  Ext: palp radial b/l UE, b/l DP palp in Lower Extrem, motor and sensory grossly intact in all 4 extremities  Back: no TTP, no palpable runoff/stepoff/deformity  Rectal: No nisa blood, LARRY with good tone    LABS  --------------------------------------------------------------------------------------  Labs:  CAPILLARY BLOOD GLUCOSE                              14.4   11.26 )-----------( 155      ( 18 May 2024 17:52 )             45.3       Auto Neutrophil %: 91.2 % (05-18-24 @ 17:52)  Band Neutrophils %: 1.8 % (05-18-24 @ 17:52)    05-18    142  |  114<H>  |  35<H>  ----------------------------<  111<H>  3.6   |  20<L>  |  1.55<H>      Calcium: 7.4 mg/dL (05-18-24 @ 20:30)      LFTs:             5.2  | 0.5  | 15       ------------------[42      ( 18 May 2024 20:30 )  3.3  | x    | 9           Lipase:25     Amylase:x         Blood Gas Venous - Lactate: 1.7 mmol/L (05-18-24 @ 17:52)      Coags:     11.2   ----< 1.07    ( 18 May 2024 17:52 )     30.4                Urinalysis Basic - ( 18 May 2024 20:30 )    Color: x / Appearance: x / SG: x / pH: x  Gluc: 111 mg/dL / Ketone: x  / Bili: x / Urobili: x   Blood: x / Protein: x / Nitrite: x   Leuk Esterase: x / RBC: x / WBC x   Sq Epi: x / Non Sq Epi: x / Bacteria: x          --------------------------------------------------------------------------------------

## 2024-05-18 NOTE — H&P ADULT - NSICDXPASTMEDICALHX_GEN_ALL_CORE_FT
PAST MEDICAL HISTORY:  Bulging disc cervical spine 10 years ago - deneies any radicular pain    Erectile dysfunction takes viagra prn    Hemorrhoids without mention of complications     HTN (hypertension) 10 years    Hypothyroidism last tft's - 1 month ago    IBS (irritable bowel syndrome)     Osteoarthritis     Polycystic kidney vague history unsure as to diagnosis, states "functioning normal "

## 2024-05-18 NOTE — H&P ADULT - NSHPLABSRESULTS_GEN_ALL_CORE
LABS:                        14.4   11.26 )-----------( 155      ( 18 May 2024 17:52 )             45.3     05-18    142  |  114<H>  |  35<H>  ----------------------------<  111<H>  3.6   |  20<L>  |  1.55<H>    Ca    7.4<L>      18 May 2024 20:30    TPro  5.2<L>  /  Alb  3.3  /  TBili  0.5  /  DBili  x   /  AST  15  /  ALT  9<L>  /  AlkPhos  42  05-18    Lactate:  05-18 @ 17:52  1.7    PT/INR - ( 18 May 2024 17:52 )   PT: 11.2 sec;   INR: 1.07 ratio         PTT - ( 18 May 2024 17:52 )  PTT:30.4 sec          Urinalysis Basic - ( 18 May 2024 20:30 )    Color: x / Appearance: x / SG: x / pH: x  Gluc: 111 mg/dL / Ketone: x  / Bili: x / Urobili: x   Blood: x / Protein: x / Nitrite: x   Leuk Esterase: x / RBC: x / WBC x   Sq Epi: x / Non Sq Epi: x / Bacteria: x        IMAGING:

## 2024-05-18 NOTE — ED PROVIDER NOTE - CARE PLAN
Principal Discharge DX:	Fracture of multiple ribs of left side  Secondary Diagnosis:	Pneumothorax, left  Secondary Diagnosis:	Lung contusion  Secondary Diagnosis:	Scapular fracture   1

## 2024-05-18 NOTE — ED PROVIDER NOTE - ATTENDING CONTRIBUTION TO CARE
80yo male pt with PMH of afib (no AC or ASA), polycystic kidney diease, PPM (placed in 2019), HTN, Hypothyroidism, IBS, SHRUTHI (not on CPAP) Presenting with mechanical fall fell backwards hit the back of head normocephalic/atraumatic denies LOC not on AC at this time she will need a CT head also complaining of left arm pain cannot raise his left shoulder and pain to his left scapular posterior rib wall pan scan was ordered in light of his age and morbidities plain films of his scapular region minor clavicle tenderness but no crepitance or deformity Oxybutynin Counseling:  I discussed with the patient the risks of oxybutynin including but not limited to skin rash, drowsiness, dry mouth, difficulty urinating, and blurred vision.

## 2024-05-18 NOTE — ED PROVIDER NOTE - CRITICAL CARE ATTENDING CONTRIBUTION TO CARE
I, Bishop Calvo, was available to discuss  the management with the advanced care provider. I reviewed the  ACP's note and agree with the documented findings and plan of care.  I performed a non-face to face encounter with the patient and available for all supervision of the ACP.    78yo male pt with PMH of afib (no AC or ASA), polycystic kidney diease, PPM (placed in 2019), HTN, Hypothyroidism, IBS, SHRUTHI (not on CPAP) Presenting with mechanical fall fell backwards hit the back of head normocephalic/atraumatic denies LOC not on AC at this time she will need a CT head also complaining of left arm pain cannot raise his left shoulder and pain to his left scapular posterior rib wall pan scan was ordered in light of his age and morbidities plain films of his scapular region minor clavicle tenderness but no crepitance or deformity.

## 2024-05-18 NOTE — H&P ADULT - ATTENDING COMMENTS
ATTENDING ATTESTATION  I have seen and examined this patient with the resident housestaff. I have reviewed all labs, imaging and reports. I have participated in formulating the plan, and have read and agree with the history, ROS, exam, assessment and plan as stated above.     S/P trip and fall at home.   Injuries: L 4,5,6 displaced rib fx and pulm contusion with small pneumo/hemothorax.  Presented with elevated potassium 6.3 (in the setting of PCKD not on dialysis) which was medically treated and decreased to 3.6. No reported history of hyperkalemia.   Plan for SICU admission due to rib score of 1. Will get chest PT, repeat CXR to eval ptx/hemothorax/pulm contusions.   Will monitor potassium.   Multimodal pain control.     Total time spent in the care of this patient today (excluding critical care, teaching & procedures): 55 min                 Over 50% of the total time was spent on counseling and coordination of care.     Venus Chauhan M.D., M.S.  Division of Acute Care Surgery

## 2024-05-19 LAB
ANION GAP SERPL CALC-SCNC: 10 MMOL/L — SIGNIFICANT CHANGE UP (ref 5–17)
APPEARANCE UR: CLEAR — SIGNIFICANT CHANGE UP
APTT BLD: 29.5 SEC — SIGNIFICANT CHANGE UP (ref 24.5–35.6)
BACTERIA # UR AUTO: NEGATIVE /HPF — SIGNIFICANT CHANGE UP
BILIRUB UR-MCNC: NEGATIVE — SIGNIFICANT CHANGE UP
BUN SERPL-MCNC: 40 MG/DL — HIGH (ref 7–23)
CALCIUM SERPL-MCNC: 9.5 MG/DL — SIGNIFICANT CHANGE UP (ref 8.4–10.5)
CAST: 0 /LPF — SIGNIFICANT CHANGE UP (ref 0–4)
CHLORIDE SERPL-SCNC: 105 MMOL/L — SIGNIFICANT CHANGE UP (ref 96–108)
CO2 SERPL-SCNC: 22 MMOL/L — SIGNIFICANT CHANGE UP (ref 22–31)
COLOR SPEC: YELLOW — SIGNIFICANT CHANGE UP
CREAT SERPL-MCNC: 1.89 MG/DL — HIGH (ref 0.5–1.3)
DIFF PNL FLD: ABNORMAL
EGFR: 36 ML/MIN/1.73M2 — LOW
GLUCOSE SERPL-MCNC: 176 MG/DL — HIGH (ref 70–99)
GLUCOSE UR QL: NEGATIVE MG/DL — SIGNIFICANT CHANGE UP
HCT VFR BLD CALC: 38.2 % — LOW (ref 39–50)
HGB BLD-MCNC: 12.5 G/DL — LOW (ref 13–17)
INR BLD: 1.1 RATIO — SIGNIFICANT CHANGE UP (ref 0.85–1.18)
KETONES UR-MCNC: NEGATIVE MG/DL — SIGNIFICANT CHANGE UP
LEUKOCYTE ESTERASE UR-ACNC: NEGATIVE — SIGNIFICANT CHANGE UP
MAGNESIUM SERPL-MCNC: 2.1 MG/DL — SIGNIFICANT CHANGE UP (ref 1.6–2.6)
MCHC RBC-ENTMCNC: 30.5 PG — SIGNIFICANT CHANGE UP (ref 27–34)
MCHC RBC-ENTMCNC: 32.7 GM/DL — SIGNIFICANT CHANGE UP (ref 32–36)
MCV RBC AUTO: 93.2 FL — SIGNIFICANT CHANGE UP (ref 80–100)
NITRITE UR-MCNC: NEGATIVE — SIGNIFICANT CHANGE UP
NRBC # BLD: 0 /100 WBCS — SIGNIFICANT CHANGE UP (ref 0–0)
PH UR: 6.5 — SIGNIFICANT CHANGE UP (ref 5–8)
PHOSPHATE SERPL-MCNC: 2.3 MG/DL — LOW (ref 2.5–4.5)
PLATELET # BLD AUTO: 143 K/UL — LOW (ref 150–400)
POTASSIUM SERPL-MCNC: 4.7 MMOL/L — SIGNIFICANT CHANGE UP (ref 3.5–5.3)
POTASSIUM SERPL-SCNC: 4.7 MMOL/L — SIGNIFICANT CHANGE UP (ref 3.5–5.3)
PROT UR-MCNC: 100 MG/DL
PROTHROM AB SERPL-ACNC: 11.5 SEC — SIGNIFICANT CHANGE UP (ref 9.5–13)
RBC # BLD: 4.1 M/UL — LOW (ref 4.2–5.8)
RBC # FLD: 14.1 % — SIGNIFICANT CHANGE UP (ref 10.3–14.5)
RBC CASTS # UR COMP ASSIST: 3 /HPF — SIGNIFICANT CHANGE UP (ref 0–4)
SODIUM SERPL-SCNC: 137 MMOL/L — SIGNIFICANT CHANGE UP (ref 135–145)
SP GR SPEC: 1.02 — SIGNIFICANT CHANGE UP (ref 1–1.03)
SQUAMOUS # UR AUTO: 0 /HPF — SIGNIFICANT CHANGE UP (ref 0–5)
UROBILINOGEN FLD QL: 0.2 MG/DL — SIGNIFICANT CHANGE UP (ref 0.2–1)
WBC # BLD: 7.12 K/UL — SIGNIFICANT CHANGE UP (ref 3.8–10.5)
WBC # FLD AUTO: 7.12 K/UL — SIGNIFICANT CHANGE UP (ref 3.8–10.5)
WBC UR QL: 0 /HPF — SIGNIFICANT CHANGE UP (ref 0–5)

## 2024-05-19 PROCEDURE — 99233 SBSQ HOSP IP/OBS HIGH 50: CPT

## 2024-05-19 PROCEDURE — 71045 X-RAY EXAM CHEST 1 VIEW: CPT | Mod: 26

## 2024-05-19 RX ORDER — FLUOXETINE HCL 10 MG
10 CAPSULE ORAL DAILY
Refills: 0 | Status: DISCONTINUED | OUTPATIENT
Start: 2024-05-19 | End: 2024-05-20

## 2024-05-19 RX ORDER — OXYCODONE HYDROCHLORIDE 5 MG/1
5 TABLET ORAL EVERY 4 HOURS
Refills: 0 | Status: DISCONTINUED | OUTPATIENT
Start: 2024-05-19 | End: 2024-05-20

## 2024-05-19 RX ORDER — IBUPROFEN 200 MG
200 TABLET ORAL EVERY 4 HOURS
Refills: 0 | Status: DISCONTINUED | OUTPATIENT
Start: 2024-05-19 | End: 2024-05-20

## 2024-05-19 RX ORDER — OXYCODONE HYDROCHLORIDE 5 MG/1
5 TABLET ORAL EVERY 4 HOURS
Refills: 0 | Status: DISCONTINUED | OUTPATIENT
Start: 2024-05-19 | End: 2024-05-19

## 2024-05-19 RX ORDER — OXYCODONE HYDROCHLORIDE 5 MG/1
10 TABLET ORAL
Refills: 0 | Status: DISCONTINUED | OUTPATIENT
Start: 2024-05-19 | End: 2024-05-19

## 2024-05-19 RX ORDER — SPIRONOLACTONE 25 MG/1
25 TABLET, FILM COATED ORAL DAILY
Refills: 0 | Status: DISCONTINUED | OUTPATIENT
Start: 2024-05-19 | End: 2024-05-20

## 2024-05-19 RX ORDER — ACETAMINOPHEN 500 MG
975 TABLET ORAL EVERY 8 HOURS
Refills: 0 | Status: DISCONTINUED | OUTPATIENT
Start: 2024-05-19 | End: 2024-05-20

## 2024-05-19 RX ORDER — SACUBITRIL AND VALSARTAN 24; 26 MG/1; MG/1
1 TABLET, FILM COATED ORAL ONCE
Refills: 0 | Status: COMPLETED | OUTPATIENT
Start: 2024-05-19 | End: 2024-05-19

## 2024-05-19 RX ORDER — ACETAMINOPHEN 500 MG
500 TABLET ORAL EVERY 6 HOURS
Refills: 0 | Status: DISCONTINUED | OUTPATIENT
Start: 2024-05-21 | End: 2024-05-20

## 2024-05-19 RX ORDER — LEVOTHYROXINE SODIUM 125 MCG
100 TABLET ORAL DAILY
Refills: 0 | Status: DISCONTINUED | OUTPATIENT
Start: 2024-05-19 | End: 2024-05-20

## 2024-05-19 RX ORDER — SACUBITRIL AND VALSARTAN 24; 26 MG/1; MG/1
1 TABLET, FILM COATED ORAL
Refills: 0 | Status: DISCONTINUED | OUTPATIENT
Start: 2024-05-19 | End: 2024-05-20

## 2024-05-19 RX ORDER — OXYCODONE HYDROCHLORIDE 5 MG/1
5 TABLET ORAL
Refills: 0 | Status: DISCONTINUED | OUTPATIENT
Start: 2024-05-19 | End: 2024-05-19

## 2024-05-19 RX ORDER — METOPROLOL TARTRATE 50 MG
50 TABLET ORAL DAILY
Refills: 0 | Status: DISCONTINUED | OUTPATIENT
Start: 2024-05-19 | End: 2024-05-20

## 2024-05-19 RX ORDER — OXYCODONE HYDROCHLORIDE 5 MG/1
10 TABLET ORAL EVERY 4 HOURS
Refills: 0 | Status: DISCONTINUED | OUTPATIENT
Start: 2024-05-19 | End: 2024-05-19

## 2024-05-19 RX ORDER — OXYCODONE HYDROCHLORIDE 5 MG/1
2.5 TABLET ORAL EVERY 4 HOURS
Refills: 0 | Status: DISCONTINUED | OUTPATIENT
Start: 2024-05-19 | End: 2024-05-20

## 2024-05-19 RX ADMIN — LIDOCAINE 1 PATCH: 4 CREAM TOPICAL at 08:02

## 2024-05-19 RX ADMIN — OXYCODONE HYDROCHLORIDE 10 MILLIGRAM(S): 5 TABLET ORAL at 06:04

## 2024-05-19 RX ADMIN — Medication 975 MILLIGRAM(S): at 21:00

## 2024-05-19 RX ADMIN — OXYCODONE HYDROCHLORIDE 10 MILLIGRAM(S): 5 TABLET ORAL at 01:53

## 2024-05-19 RX ADMIN — LIDOCAINE 1 PATCH: 4 CREAM TOPICAL at 05:41

## 2024-05-19 RX ADMIN — CHLORHEXIDINE GLUCONATE 1 APPLICATION(S): 213 SOLUTION TOPICAL at 05:41

## 2024-05-19 RX ADMIN — OXYCODONE HYDROCHLORIDE 10 MILLIGRAM(S): 5 TABLET ORAL at 07:07

## 2024-05-19 RX ADMIN — LIDOCAINE 1 PATCH: 4 CREAM TOPICAL at 18:00

## 2024-05-19 RX ADMIN — Medication 975 MILLIGRAM(S): at 20:00

## 2024-05-19 RX ADMIN — Medication 400 MILLIGRAM(S): at 07:18

## 2024-05-19 RX ADMIN — SACUBITRIL AND VALSARTAN 1 TABLET(S): 24; 26 TABLET, FILM COATED ORAL at 22:37

## 2024-05-19 RX ADMIN — Medication 975 MILLIGRAM(S): at 12:12

## 2024-05-19 RX ADMIN — Medication 1000 MILLIGRAM(S): at 07:18

## 2024-05-19 RX ADMIN — LIDOCAINE 1 PATCH: 4 CREAM TOPICAL at 20:00

## 2024-05-19 RX ADMIN — OXYCODONE HYDROCHLORIDE 10 MILLIGRAM(S): 5 TABLET ORAL at 01:48

## 2024-05-19 RX ADMIN — SENNA PLUS 2 TABLET(S): 8.6 TABLET ORAL at 20:07

## 2024-05-19 RX ADMIN — Medication 255 MILLIMOLE(S): at 07:56

## 2024-05-19 RX ADMIN — OXYCODONE HYDROCHLORIDE 5 MILLIGRAM(S): 5 TABLET ORAL at 00:45

## 2024-05-19 RX ADMIN — HYDROMORPHONE HYDROCHLORIDE 0.25 MILLIGRAM(S): 2 INJECTION INTRAMUSCULAR; INTRAVENOUS; SUBCUTANEOUS at 00:35

## 2024-05-19 RX ADMIN — Medication 975 MILLIGRAM(S): at 12:42

## 2024-05-19 RX ADMIN — ENOXAPARIN SODIUM 40 MILLIGRAM(S): 100 INJECTION SUBCUTANEOUS at 06:04

## 2024-05-19 RX ADMIN — HYDROMORPHONE HYDROCHLORIDE 0.25 MILLIGRAM(S): 2 INJECTION INTRAMUSCULAR; INTRAVENOUS; SUBCUTANEOUS at 00:18

## 2024-05-19 NOTE — PHYSICAL THERAPY INITIAL EVALUATION ADULT - NSPTDISCHREC_GEN_A_CORE
DC home with home PT services, assist from family for mobility/ADLs, will assess amb with std cane, SW to be notified./Home PT

## 2024-05-19 NOTE — PHYSICAL THERAPY INITIAL EVALUATION ADULT - ADDITIONAL COMMENTS
Per pt, pt resides at home with spouse and step son, in an apartment, +few steps at front entrance but mostly uses side entrance with no stairs to negotiate, +elevator access to 6th floor apt, PTA ind amb and ADLs, owns a cane?, +shower chair, doesn't drive but is a community ambulator and goes to the grocery store, spouse drives. +[place], wears glasses, +RH

## 2024-05-19 NOTE — PROVIDER CONTACT NOTE (OTHER) - ASSESSMENT
pt bp 171/95. Pt states he takes metoprolol & entresto at home. next dose for AM, today dose held for NPO status earlier.

## 2024-05-19 NOTE — PHYSICAL THERAPY INITIAL EVALUATION ADULT - STANDING BALANCE: DYNAMIC, REHAB EVAL
· CAD status post recent stent July 2021  · Continue DA PT atorvastatin    · Holding metoprolol due to hypotension fair balance

## 2024-05-19 NOTE — PHYSICAL THERAPY INITIAL EVALUATION ADULT - GENERAL OBSERVATIONS, REHAB EVAL
Pt is s/p fall at home, +L rib fx 4-6 displaced, +L scapula fx, +pulm contusion. Pt received supine in bed, +ICU monitoring, +LUE sling, +IVL, A&OX4, follows commands, at times requires incr time for answering questions. Per trauma/SICU team, NWB LUE in sling. +lidocaine patch

## 2024-05-19 NOTE — PHYSICAL THERAPY INITIAL EVALUATION ADULT - PERTINENT HX OF CURRENT PROBLEM, REHAB EVAL
Pt is a 80yo male admitted to [facility] on 5/18/24 pt with PMH of afib (no AC or ASA), polycystic kidney diease, PPM (placed in 2019), HTN, Hypothyroidism, IBS, SHRUTHI (not on CPAP),  presents to ED after mechanical fall at home. Reports left upper back pain, left shoulder pain, and left chest wall pain pain  +HS/No LOC. Reports he tripped on uneven floor and fell backward hitting his head on the floor. Denies  headache, dizziness, visual changes or N/V. Denies neck pain. Denies sensory changes or weakness to extremities. Denies CP/SOB/ABD pain. Denies hip pain. Denies fever, chills, or recent sickness. Hospital course: In the Ed initially potassium 6.3 on labs s/p shift by ED team now 3.6. Labs otherwise unremarkable. Trauma scans revealed L 4,5,6th diaplced rib fractures, plumonary contusion, small apical pneumo and small hemothorax. SICU for Rib score 1 and pulm contusion, recommend transfer to floor, pulling [id number] on IS, MAP goal > 65, CT Tspine: No thoracic or lumbar spine fracture. Left scapula and few left rib fractures. Trace pneumothorax. Please refer to complete detail on separately reported chest CT. CT abd/pelvis: Acute displaced fractures of the left posterior 4th, 5th and 6th ribs. There is comminuted fracture of the left scapula. There is an associated hematoma. Small left pleural hemothorax. Trace left apical pneumothorax with additional locules of air within the pleural fluid. Mild airspace opacity in the lung apex and left lower lobe likely representing lung contusion. Findings are compatible with a hemopneumothorax. No evidence of abdominal or pelvic trauma. CTH: No acute intracranial hemorrhage or calvarial fracture CT Cspine: No acute fracture or subluxation. Partially seen thoracic findings include nondisplaced left 4th rib fracture and trace pneumothorax. Please refer to separate complete reporting of chest CT findings.

## 2024-05-19 NOTE — PROGRESS NOTE ADULT - ATTENDING COMMENTS
Mechanical fall with  -L 4,5,6 rib fx with pul contusion, small PTX,   -L scapular fx    Repeat CT chest no apparent ptx  Multimodal pain control, pul toileting, OOB/PT/IS/f/u CXR  Hemodynamically stable  PO  CKD stable, hyperkalemia resolved  BS controlled  Start pharm DVT ppx  with  Lovenox, CrCl 33

## 2024-05-19 NOTE — PHYSICAL THERAPY INITIAL EVALUATION ADULT - ACTIVE RANGE OF MOTION EXAMINATION, REHAB EVAL
Omar NICOLAS
BLE and RUE WFL, LUE fingers/wrist/elbow WFL, shld deferred 2* scap fx/Left UE Active ROM was WFL (within functional limits)/Right UE Active ROM was WFL (within functional limits)/Left LE Active ROM was WFL (within functional limits)/Right LE Active ROM was WFL (within functional limits)

## 2024-05-20 ENCOUNTER — TRANSCRIPTION ENCOUNTER (OUTPATIENT)
Age: 79
End: 2024-05-20

## 2024-05-20 VITALS
TEMPERATURE: 97 F | HEART RATE: 60 BPM | DIASTOLIC BLOOD PRESSURE: 86 MMHG | SYSTOLIC BLOOD PRESSURE: 129 MMHG | RESPIRATION RATE: 20 BRPM | OXYGEN SATURATION: 98 %

## 2024-05-20 LAB
ANION GAP SERPL CALC-SCNC: 13 MMOL/L — SIGNIFICANT CHANGE UP (ref 5–17)
BUN SERPL-MCNC: 42 MG/DL — HIGH (ref 7–23)
CALCIUM SERPL-MCNC: 9.8 MG/DL — SIGNIFICANT CHANGE UP (ref 8.4–10.5)
CHLORIDE SERPL-SCNC: 104 MMOL/L — SIGNIFICANT CHANGE UP (ref 96–108)
CO2 SERPL-SCNC: 22 MMOL/L — SIGNIFICANT CHANGE UP (ref 22–31)
CREAT SERPL-MCNC: 2.05 MG/DL — HIGH (ref 0.5–1.3)
EGFR: 33 ML/MIN/1.73M2 — LOW
GLUCOSE SERPL-MCNC: 104 MG/DL — HIGH (ref 70–99)
HCT VFR BLD CALC: 40.3 % — SIGNIFICANT CHANGE UP (ref 39–50)
HGB BLD-MCNC: 13 G/DL — SIGNIFICANT CHANGE UP (ref 13–17)
MAGNESIUM SERPL-MCNC: 2.2 MG/DL — SIGNIFICANT CHANGE UP (ref 1.6–2.6)
MCHC RBC-ENTMCNC: 30.3 PG — SIGNIFICANT CHANGE UP (ref 27–34)
MCHC RBC-ENTMCNC: 32.3 GM/DL — SIGNIFICANT CHANGE UP (ref 32–36)
MCV RBC AUTO: 93.9 FL — SIGNIFICANT CHANGE UP (ref 80–100)
NRBC # BLD: 0 /100 WBCS — SIGNIFICANT CHANGE UP (ref 0–0)
PHOSPHATE SERPL-MCNC: 2.7 MG/DL — SIGNIFICANT CHANGE UP (ref 2.5–4.5)
PLATELET # BLD AUTO: 158 K/UL — SIGNIFICANT CHANGE UP (ref 150–400)
POTASSIUM SERPL-MCNC: 4.3 MMOL/L — SIGNIFICANT CHANGE UP (ref 3.5–5.3)
POTASSIUM SERPL-SCNC: 4.3 MMOL/L — SIGNIFICANT CHANGE UP (ref 3.5–5.3)
RBC # BLD: 4.29 M/UL — SIGNIFICANT CHANGE UP (ref 4.2–5.8)
RBC # FLD: 14.4 % — SIGNIFICANT CHANGE UP (ref 10.3–14.5)
SODIUM SERPL-SCNC: 139 MMOL/L — SIGNIFICANT CHANGE UP (ref 135–145)
WBC # BLD: 7.88 K/UL — SIGNIFICANT CHANGE UP (ref 3.8–10.5)
WBC # FLD AUTO: 7.88 K/UL — SIGNIFICANT CHANGE UP (ref 3.8–10.5)

## 2024-05-20 PROCEDURE — 85610 PROTHROMBIN TIME: CPT

## 2024-05-20 PROCEDURE — 82330 ASSAY OF CALCIUM: CPT

## 2024-05-20 PROCEDURE — 96365 THER/PROPH/DIAG IV INF INIT: CPT

## 2024-05-20 PROCEDURE — 84295 ASSAY OF SERUM SODIUM: CPT

## 2024-05-20 PROCEDURE — 72125 CT NECK SPINE W/O DYE: CPT | Mod: MC

## 2024-05-20 PROCEDURE — 82947 ASSAY GLUCOSE BLOOD QUANT: CPT

## 2024-05-20 PROCEDURE — 97116 GAIT TRAINING THERAPY: CPT

## 2024-05-20 PROCEDURE — 99291 CRITICAL CARE FIRST HOUR: CPT

## 2024-05-20 PROCEDURE — 80048 BASIC METABOLIC PNL TOTAL CA: CPT

## 2024-05-20 PROCEDURE — 70450 CT HEAD/BRAIN W/O DYE: CPT | Mod: MC

## 2024-05-20 PROCEDURE — 71045 X-RAY EXAM CHEST 1 VIEW: CPT | Mod: 26

## 2024-05-20 PROCEDURE — 85014 HEMATOCRIT: CPT

## 2024-05-20 PROCEDURE — 97110 THERAPEUTIC EXERCISES: CPT

## 2024-05-20 PROCEDURE — 83735 ASSAY OF MAGNESIUM: CPT

## 2024-05-20 PROCEDURE — 84100 ASSAY OF PHOSPHORUS: CPT

## 2024-05-20 PROCEDURE — 74176 CT ABD & PELVIS W/O CONTRAST: CPT | Mod: MC

## 2024-05-20 PROCEDURE — 80053 COMPREHEN METABOLIC PANEL: CPT

## 2024-05-20 PROCEDURE — 85018 HEMOGLOBIN: CPT

## 2024-05-20 PROCEDURE — 81001 URINALYSIS AUTO W/SCOPE: CPT

## 2024-05-20 PROCEDURE — 82803 BLOOD GASES ANY COMBINATION: CPT

## 2024-05-20 PROCEDURE — 73030 X-RAY EXAM OF SHOULDER: CPT

## 2024-05-20 PROCEDURE — 85730 THROMBOPLASTIN TIME PARTIAL: CPT

## 2024-05-20 PROCEDURE — 82435 ASSAY OF BLOOD CHLORIDE: CPT

## 2024-05-20 PROCEDURE — 73010 X-RAY EXAM OF SHOULDER BLADE: CPT

## 2024-05-20 PROCEDURE — 85025 COMPLETE CBC W/AUTO DIFF WBC: CPT

## 2024-05-20 PROCEDURE — 97165 OT EVAL LOW COMPLEX 30 MIN: CPT

## 2024-05-20 PROCEDURE — 83690 ASSAY OF LIPASE: CPT

## 2024-05-20 PROCEDURE — 71045 X-RAY EXAM CHEST 1 VIEW: CPT

## 2024-05-20 PROCEDURE — 97161 PT EVAL LOW COMPLEX 20 MIN: CPT

## 2024-05-20 PROCEDURE — 84132 ASSAY OF SERUM POTASSIUM: CPT

## 2024-05-20 PROCEDURE — 71250 CT THORAX DX C-: CPT | Mod: MC

## 2024-05-20 PROCEDURE — 36415 COLL VENOUS BLD VENIPUNCTURE: CPT

## 2024-05-20 PROCEDURE — 85027 COMPLETE CBC AUTOMATED: CPT

## 2024-05-20 PROCEDURE — 83605 ASSAY OF LACTIC ACID: CPT

## 2024-05-20 RX ORDER — SACUBITRIL AND VALSARTAN 24; 26 MG/1; MG/1
1 TABLET, FILM COATED ORAL
Qty: 0 | Refills: 0 | DISCHARGE
Start: 2024-05-20

## 2024-05-20 RX ORDER — OXYCODONE HYDROCHLORIDE 5 MG/1
2.5 TABLET ORAL
Qty: 0 | Refills: 0 | DISCHARGE
Start: 2024-05-20

## 2024-05-20 RX ORDER — ACETAMINOPHEN 500 MG
1 TABLET ORAL
Qty: 0 | Refills: 0 | DISCHARGE
Start: 2024-05-20

## 2024-05-20 RX ORDER — OXYCODONE HYDROCHLORIDE 5 MG/1
1 TABLET ORAL
Qty: 12 | Refills: 0
Start: 2024-05-20 | End: 2024-05-21

## 2024-05-20 RX ORDER — FLUOXETINE HCL 10 MG
1 CAPSULE ORAL
Qty: 0 | Refills: 0 | DISCHARGE
Start: 2024-05-20

## 2024-05-20 RX ORDER — SPIRONOLACTONE 25 MG/1
1 TABLET, FILM COATED ORAL
Qty: 0 | Refills: 0 | DISCHARGE
Start: 2024-05-20

## 2024-05-20 RX ADMIN — ENOXAPARIN SODIUM 40 MILLIGRAM(S): 100 INJECTION SUBCUTANEOUS at 05:32

## 2024-05-20 RX ADMIN — Medication 50 MILLIGRAM(S): at 05:26

## 2024-05-20 RX ADMIN — Medication 975 MILLIGRAM(S): at 13:00

## 2024-05-20 RX ADMIN — Medication 975 MILLIGRAM(S): at 05:26

## 2024-05-20 RX ADMIN — POLYETHYLENE GLYCOL 3350 17 GRAM(S): 17 POWDER, FOR SOLUTION ORAL at 05:25

## 2024-05-20 RX ADMIN — Medication 975 MILLIGRAM(S): at 12:12

## 2024-05-20 RX ADMIN — Medication 10 MILLIGRAM(S): at 11:24

## 2024-05-20 RX ADMIN — CHLORHEXIDINE GLUCONATE 1 APPLICATION(S): 213 SOLUTION TOPICAL at 08:32

## 2024-05-20 RX ADMIN — LIDOCAINE 1 PATCH: 4 CREAM TOPICAL at 08:36

## 2024-05-20 RX ADMIN — SACUBITRIL AND VALSARTAN 1 TABLET(S): 24; 26 TABLET, FILM COATED ORAL at 05:25

## 2024-05-20 RX ADMIN — SPIRONOLACTONE 25 MILLIGRAM(S): 25 TABLET, FILM COATED ORAL at 05:26

## 2024-05-20 RX ADMIN — Medication 100 MICROGRAM(S): at 05:26

## 2024-05-20 NOTE — OCCUPATIONAL THERAPY INITIAL EVALUATION ADULT - TRANSFER TRAINING, PT EVAL
Patient will transfer to toilet with DME as needed (I) in 4 weeks. Patient will safely transfer in/out of tub with DME as needed, (I) within 4 weeks

## 2024-05-20 NOTE — CHART NOTE - NSCHARTNOTEFT_GEN_A_CORE
TRAUMA SERVICE TERTIARY EXAM    Admit Date:  5/18/24                              Trauma Activation: Trauma consult    HPI: 79yr M with PMH of afib (no AC or ASA), polycystic kidney diease, PPM (placed in 2019), HTN, Hypothyroidism, IBS, SHRUTHI (not on CPAP), presents to ED after mechanical fall at home. Reports left upper back pain, left shoulder pain, and left chest wall pain +HS/No LOC. Reports he tripped on uneven floor and fell backward hitting his head on the floor. Patient found to have non-displaced fracture of the left 4th-6th ribs, a comminuted fracture of the left scapula, small hemo/pneumothorax, and pulmonary contusion. Patient seen and examined today for tertiary examination. Reporting pain of the L chest and L upper back.     TERTIARY SURVEY:   GEN: resting comfortably in bed, in NAD  HEENT: normocephalic, non-tender to palpation, no abrasions visible, no step-offs palpated  NECK: no JVD, non-tender to palpation at posterior midline, no pain with flexion, extension, and bilateral neck rotation  CHEST: non-tender to palpation across clavicles and b/l anterior ribs  BACK: non-tender to palpation along cervical, thoracic, lumbar spine midline; no palpable step-offs or hematomas, Left upper back tenderness to palpation  ABD: soft, non-distended, non-tender to palpation in all quadrants without rebound tenderness or guarding  LUE: non-tender to palpation across upper and lower arm, 5/5  strength, fingers warm, well-perfused, palpable radial, LUE in sling  RUE: non-tender to palpation across upper and lower arm, 5/5  strength, fingers warm, well-perfused, full ROM in shoulder, elbow, wrist, and fingers, palpable radial + ulnar pulses  LLE: non-tender to palpation across upper and lower leg; full ROM in hip, knee, ankle, and toes, 5/5 dorsiflexion + plantarflexion, palpable DP pulse; warm, well-perfused  RLE: non-tender to palpation across upper and lower leg; full ROM in hip, knee, ankle, and toes, 5/5 dorsiflexion + plantarflexion, palpable DP pulse; warm, well-perfused  NEURO: AAOx4, no focal neuro deficits; CN II-IX intact     Medications (inpatient): acetaminophen     Tablet .. 975 milliGRAM(s) Oral every 8 hours  chlorhexidine 2% Cloths 1 Application(s) Topical <User Schedule>  enoxaparin Injectable 40 milliGRAM(s) SubCutaneous <User Schedule>  FLUoxetine 10 milliGRAM(s) Oral daily  levothyroxine 100 MICROGram(s) Oral daily  lidocaine   4% Patch 1 Patch Transdermal every 24 hours  metoprolol succinate ER 50 milliGRAM(s) Oral daily  polyethylene glycol 3350 17 Gram(s) Oral every 12 hours  sacubitril 49 mG/valsartan 51 mG 1 Tablet(s) Oral two times a day  senna 2 Tablet(s) Oral at bedtime  spironolactone 25 milliGRAM(s) Oral daily    Medications (PRN):oxyCODONE    IR 2.5 milliGRAM(s) Oral every 4 hours PRN  oxyCODONE    IR 5 milliGRAM(s) Oral every 4 hours PRN    Allergies: No Known Allergies  (Intolerances: )    Vital Signs Last 24 Hrs  T(C): 36.7 (20 May 2024 08:06), Max: 37 (19 May 2024 15:54)  T(F): 98.1 (20 May 2024 08:06), Max: 98.6 (19 May 2024 15:54)  HR: 60 (20 May 2024 09:43) (58 - 62)  BP: 173/99 (20 May 2024 09:43) (129/86 - 175/107)  BP(mean): --  RR: 20 (20 May 2024 08:06) (20 - 20)  SpO2: 94% (20 May 2024 09:43) (94% - 98%)    Parameters below as of 20 May 2024 09:43  Patient On (Oxygen Delivery Method): room air      Drug Dosing Weight  Height (cm): 167.6 (18 May 2024 14:18)  Weight (kg): 72.6 (18 May 2024 14:18)  BMI (kg/m2): 25.8 (18 May 2024 14:18)  BSA (m2): 1.82 (18 May 2024 14:18)                          13.0   7.88  )-----------( 158      ( 20 May 2024 06:55 )             40.3     05-20    139  |  104  |  42<H>  ----------------------------<  104<H>  4.3   |  22  |  2.05<H>    Ca    9.8      20 May 2024 06:55  Phos  2.7     05-20  Mg     2.2     05-20    TPro  5.2<L>  /  Alb  3.3  /  TBili  0.5  /  DBili  x   /  AST  15  /  ALT  9<L>  /  AlkPhos  42  05-18    PT/INR - ( 19 May 2024 03:09 )   PT: 11.5 sec;   INR: 1.10 ratio         PTT - ( 19 May 2024 03:09 )  PTT:29.5 sec  Urinalysis Basic - ( 20 May 2024 06:55 )    Color: x / Appearance: x / SG: x / pH: x  Gluc: 104 mg/dL / Ketone: x  / Bili: x / Urobili: x   Blood: x / Protein: x / Nitrite: x   Leuk Esterase: x / RBC: x / WBC x   Sq Epi: x / Non Sq Epi: x / Bacteria: x    List Operative and Interventional Radiological Procedures:     Consults (Date):  [  ] Neurosurgery   [ X ] Orthopedics  [  ] Plastics  [  ] Urology  [  ] PM&R  [  ] Social Work    RADIOLOGICAL FINDINGS REVIEW:  EXAM: XR SCAPULA COMPLETE LT ORDERED BY: VIJAY VANG  ACC: [id number] EXAM: XR SHOULDER COMP MIN 2V LT ORDERED BY: VIJAY VANG  PROCEDURE DATE: 05/18/2024  INTERPRETATION: CLINICAL INFORMATION: Left shoulder pain.    EXAM: 3 views left shoulder, 2 views left scapula    COMPARISON: Chest x-ray 2/8/2021.    FINDINGS:  Acute mildly displaced fractures of left fifth and sixth ribs. Comminuted fracture the left scapula. No other acute fracture or dislocation identified.    Mild glenohumeral and acromioclavicular joint arthrosis.    Prominent interstitial markings and possible left pleural effusion. Left chest wall cardiac device.    IMPRESSION:  Acute mildly displaced fractures of the left fifth and sixth ribs.  Comminuted left scapular fracture.  No other acute fracture or dislocation identified.  Prominent interstitial markings and possible left pleural effusion.    ACC: [id number] EXAM: CT ABDOMEN AND PELVIS ORDERED BY: VIJAY VANG  ACC: [id number] EXAM: CT CHEST ORDERED BY: VIJAY VANG  PROCEDURE DATE: 05/18/2024  INTERPRETATION: CLINICAL INFORMATION: Status post fall, left-sided rib and scapular pain    COMPARISON: Renal ultrasound 2/6/2021.    CONTRAST/COMPLICATIONS:  IV Contrast: NONE  Oral Contrast: NONE  Complications: None reported at time of study completion    PROCEDURE:  CT of the Chest, Abdomen and Pelvis was performed.  Sagittal and coronal reformats were performed.    FINDINGS:  CHEST:  LUNGS AND LARGE AIRWAYS: Patent central airways. Groundglass opacities in the bilateral lower lobes, left greater than right, likely representing subsegmental atelectasis.  PLEURA: Small left pleural hemothorax. Trace left apical pneumothorax with additional locules of air within the pleural fluid. Mild airspace opacity in the lung apex and left lower lobe likely representing lung contusion. Findings are compatible with a hemopneumothorax.  VESSELS: Within normal limits.  HEART: Cardiomegaly. Left chest wall dual-lead pacemaker with leads in the right atrium and right ventricle. No pericardial effusion.  MEDIASTINUM AND JUAN: No lymphadenopathy.  CHEST WALL AND LOWER NECK: Acute displaced fractures of the left posterior 4th, 5th and 6th ribs.    ABDOMEN AND PELVIS:  LIVER: Within normal limits.  BILE DUCTS: Normal caliber.  GALLBLADDER: Within normal limits.  SPLEEN: Within normal limits.  PANCREAS: Within normal limits.  ADRENALS: Within normal limits.  KIDNEYS/URETERS: Innumerable large bilateral cysts, compatible with history of polycystic kidney disease.    BLADDER: Within normal limits.  REPRODUCTIVE ORGANS: Prostate is enlarged.    BOWEL: No bowel obstruction. Appendix is not visualized. No evidence of inflammation in the pericecal region. Small hiatal hernia. Colonic diverticulosis without diverticulitis.  PERITONEUM: No ascites.  VESSELS: Atherosclerotic changes.  RETROPERITONEUM/LYMPH NODES: No lymphadenopathy.  ABDOMINAL WALL: Within normal limits.  BONES: Degenerative changes. Acute displaced fractures of the left posterior 4th, 5th and 6th ribs.  There is comminuted fracture of the left scapula. There is an associated hematoma.    IMPRESSION:  Acute displaced fractures of the left posterior 4th, 5th and 6th ribs.    There is comminuted fracture of the left scapula. There is an associated hematoma.    Small left pleural hemothorax. Trace left apical pneumothorax with additional locules of air within the pleural fluid. Mild airspace opacity in the lung apex and left lower lobe likely representing lung contusion. Findings are compatible with a hemopneumothorax.    No evidence of abdominal or pelvic trauma.    ACC: [id number] EXAM: CT CERVICAL SPINE ORDERED BY: VIJAY VANG  ACC: [id number] EXAM: CT BRAIN ORDERED BY: VIJAY VANG  PROCEDURE DATE: 05/18/202  INTERPRETATION: CLINICAL INFORMATION: Fall    TECHNIQUE:  Axial CT images were acquired through the head and cervical spine.  Intravenous contrast: None  Sagittal and coronal reformats were generated.    COMPARISON STUDY: CT head 2/5/2021.    FINDINGS:  CT head:  There is no mass effect, intracranial hemorrhage, or midline shift.    There is no CT evidence of acute territorial infarct.    The ventricles and sulci are appropriate in size and configuration for patient's stated age, unchanged.    Calvarium intact on the bone window images. No fracture. Paranasal sinuses and mastoids are free of acute disease.    CT cervical spine:    There is no evidence of fracture or subluxation. There is nonspecific straightening of lordosis. Mild dextrocurvature seen as well.    Vertebral bodies are maintained in height. No compression deformity, fracture defect or focal/destructive bony lesion.    No prevertebral edema. Disc degeneration is multilevel with moderate height loss at the C3-4, C5-6, C6-7 and C7-T1 levels. Broad disc protrusions and small osteophyte ridging is present. No high-grade spinal canal stenosis. Neural foraminal narrowing from uncinate spurring is multilevel and left asymmetric to the uncinate spurring.    At lower limits of the scan there is nondisplaced fracture of the left fourth rib and trace pneumothorax.    IMPRESSION:    CT HEAD:  No acute intracranial hemorrhage or calvarial fracture    CT CERVICAL SPINE:  No acute fracture or subluxation.    Partially seen thoracic findings include nondisplaced left 4th rib fracture and trace pneumothorax. Please refer to separate complete reporting of chest CT findings.    ACC: [id number] EXAM: CT REFORM SPINE L ORDERED BY: VIJAY VANG  ACC: [id number] EXAM: CT REFORM SPINE T ORDERED BY: VIJAY VANG  PROCEDURE DATE: 05/18/2024  INTERPRETATION: CLINICAL INFORMATION: Status post fall    Exam: Multiplanar CT imaging of the thoracic and lumbar spine is obtained, retroreformatted from concurrent CT chest, abdomen and pelvis study. No contrast given.    Comparison: None    Findings:    Alignment of the thoracolumbar spine is essentially anatomic aside from mild undulating curvature on the coronal series.    There is no thoracic or lumbar compression deformity. No visible spinal fracture or focal/aggressive bony lesion of thoracolumbar spine.    There are several left rib fractures (at least 4 through 6) and trace pneumothorax. Left scapula also fractured. Please refer to chest CT reporting for full detail.    Thoracic and lumbar spinal canal appears patent, without bony spinal stenosis or large/visible herniation.    No paraspinal mass lesion or hematoma. Polycystic kidneys incidentally noted.      IMPRESSION:  No thoracic or lumbar spine fracture.  Left scapula and few left rib fractures. Trace pneumothorax. Please refer to complete detail on separately reported chest CT.    ASSESSMENT: 79yr M who presented to the ED on 5/18 s/p mechanical fall found to have the following injuries. Patient now seen and examined for tertiary examination.  Injuries:  - Left 4th-6th non-displaced rib fractures  - L comminuted scapular fracture  - Hemo/pneumothorax L chest  - L pulmonary contusion    PLAN:   - No further injuries identified on tertiary examination  - LUE in sling, WBAT LUE  - Continue IS    ACS/Trauma  [id number]

## 2024-05-20 NOTE — DISCHARGE NOTE PROVIDER - PROVIDER TOKENS
PROVIDER:[TOKEN:[[id number]],FOLLOWUP:[1 week]],PROVIDER:[TOKEN:[[id number]],FOLLOWUP:[Routine]] PROVIDER:[TOKEN:[[id number]],FOLLOWUP:[1 week]],PROVIDER:[TOKEN:[[id number]],FOLLOWUP:[Routine]],PROVIDER:[TOKEN:[[id number]],FOLLOWUP:[1 week]]

## 2024-05-20 NOTE — DISCHARGE NOTE PROVIDER - HOSPITAL COURSE
78yo male pt with PMH of afib (no AC or ASA), polycystic kidney diease, PPM (placed in 2019), HTN, Hypothyroidism, IBS, SHRUTHI (not on CPAP),  presents to ED after mechanical fall at home. Reports left upper back pain, left shoulder pain, and left chest wall pain pain  +HS/No LOC. Reports he tripped on uneven floor and fell backward hitting his head on the floor. Denies  headache, dizziness, visual changes or N/V. Denies neck pain. Denies sensory changes or weakness to extremities. Denies CP/SOB/ABD pain. Denies hip pain. Denies fever, chills, or recent sickness.    In the Ed initially potassium 6.3 on labs s/p shift by ED team now 3.6. Labs otherwise unremarkable. Trauma scans revealed L 4,5,6th diaplced rib fractures, plumonary contusion, small apical pneumo and small hemothorax.     Rib score 1 patient admitted to SICU.     MOnitored in SICU.     Hyperkalemia resolved     Seen by PT/OT home PT  80yo male pt with PMH of afib (no AC or ASA), polycystic kidney diease, PPM (placed in 2019), HTN, Hypothyroidism, IBS, SHRUTHI (not on CPAP),  presents to ED after mechanical fall at home. Reports left upper back pain, left shoulder pain, and left chest wall pain pain  +HS/No LOC. Reports he tripped on uneven floor and fell backward hitting his head on the floor. Denies  headache, dizziness, visual changes or N/V. Denies neck pain. Denies sensory changes or weakness to extremities. Denies CP/SOB/ABD pain. Denies hip pain. Denies fever, chills, or recent sickness.    In the Ed initially potassium 6.3 on labs s/p shift by ED team now 3.6. Labs otherwise unremarkable. Trauma scans revealed L 4,5,6th diaplced rib fractures, plumonary contusion, small apical pneumo and small hemothorax.     Rib score 1 patient admitted to SICU.     MOnitored in SICU.     Hyperkalemia resolved     Seen by PT/OT home PT     Tertiary no further injuries.     patient sen by medicine outpatient follow up with Dr. Jorge Rendon

## 2024-05-20 NOTE — DISCHARGE NOTE PROVIDER - NSDCMRMEDTOKEN_GEN_ALL_CORE_FT
acetaminophen 325 mg oral tablet: 2 tab(s) orally every 4 hours, As needed, Temp greater or equal to 38.5C (101.3F)  amLODIPine 5 mg oral tablet: 1 tab(s) orally once a day  Eliquis 5 mg oral tablet: 1 tab(s) orally 2 times a day  levothyroxine 100 mcg (0.1 mg) oral tablet: 1 tab(s) orally once a day  polyethylene glycol 3350 oral powder for reconstitution: 17 gram(s) orally once a day as needed for constipation   senna oral tablet: 2 tab(s) orally once a day (at bedtime) as needed for constipation   Toprol-XL 25 mg oral tablet, extended release: 1 tab(s) orally once a day   acetaminophen 500 mg oral tablet: 1 tab(s) orally every 6 hours As needed Mild Pain (1 - 3)  amLODIPine 5 mg oral tablet: 1 tab(s) orally once a day  Eliquis 5 mg oral tablet: 1 tab(s) orally 2 times a day  FLUoxetine 10 mg oral capsule: 1 cap(s) orally once a day  levothyroxine 100 mcg (0.1 mg) oral tablet: 1 tab(s) orally once a day  oxyCODONE: 2.5 milligram(s) orally every 4 hours as needed for  moderate pain  oxyCODONE 5 mg oral tablet: 1 tab(s) orally every 4 hours as needed for Severe Pain (7 - 10) MDD: 6  polyethylene glycol 3350 oral powder for reconstitution: 17 gram(s) orally once a day as needed for constipation   sacubitril-valsartan 49 mg-51 mg oral tablet: 1 tab(s) orally 2 times a day  senna oral tablet: 2 tab(s) orally once a day (at bedtime) as needed for constipation   spironolactone 25 mg oral tablet: 1 tab(s) orally once a day  Toprol-XL 25 mg oral tablet, extended release: 1 tab(s) orally once a day

## 2024-05-20 NOTE — OCCUPATIONAL THERAPY INITIAL EVALUATION ADULT - PERTINENT HX OF CURRENT PROBLEM, REHAB EVAL
78yo male pt with PMH of afib (no AC or ASA), polycystic kidney diease, PPM (placed in 2019), HTN, Hypothyroidism, IBS, SHRUTHI (not on CPAP),  presents to ED after mechanical fall at home. Reports left upper back pain, left shoulder pain, and left chest wall pain pain  +HS/No LOC. Reports he tripped on uneven floor and fell backward hitting his head on the floor. Denies  headache, dizziness, visual changes or N/V. Denies neck pain. Denies sensory changes or weakness to extremities. Denies CP/SOB/ABD pain. Denies hip pain. Denies fever, chills, or recent sickness. In the Ed initially potassium 6.3 on labs s/p shift by ED team now 3.6. Labs otherwise unremarkable. Trauma scans revealed L 4,5,6th displaced rib fractures, plumonary contusion,  xray spine-No thoracic or lumbar spine fracture. Left scapula and few left rib fractures. Trace pneumothorax. Please refer to complete detail on separately reported chest CT.  CT abdomen/pelvis-Acute displaced fractures of the left posterior 4th, 5th and 6th ribs.There is comminuted fracture of the left scapula. There is an associated hematoma.Small left pleural hemothorax. Trace left apical pneumothorax with additional locules of air within the pleural fluid. Mild airspace opacity in the lung apex and left lower lobe likely representing lung contusion. Findings are compatible with a hemopneumothorax.No evidence of abdominal or pelvic trauma.  CT HEAD:No acute intracranial hemorrhage or calvarial fracture.  CT CERVICAL SPINE:No acute fracture or subluxation. Partially seen thoracic findings include nondisplaced left 4th rib fracture and trace pneumothorax. Please refer to separate complete reporting of chest CT findings.  xray LUE- Acute mildly displaced fractures of the left fifth and sixth ribs. Comminuted left scapular fracture. No other acute fracture or dislocation identified. Prominent interstitial markings and possible left pleural effusion.

## 2024-05-20 NOTE — DISCHARGE NOTE PROVIDER - NSDCCPCAREPLAN_GEN_ALL_CORE_FT
PRINCIPAL DISCHARGE DIAGNOSIS  Diagnosis: Fracture of multiple ribs of left side  Assessment and Plan of Treatment: Please utilize Tylenol  prior to using oxycodone/narcotics.  Do not take more than 4gm of Tylenol in a 24 hour period. Please stagger medications for around the clock pain control.  if you are utilizing narcotics, you may use over the counter Miralax, Senna or Colace to prevent constipation.  Follow up with trauma Dr. Laureano ONLY as needed basis- Rib fractures take time to heal on their own and don't require any necessary follow up unless you have any questions or concerns  You may use Salanpas 1% topical lidocaine patches over the counter for topical pain relief  Please use incentive spirometry 10 times an hour while awake to prevent pneumoia   Return to nearest emergency room with worsening shortness of breath, pain not controlled on discharge medications, fever, chills or syncope  Please follow up with your regular medical doctor in 1 week, please call to schedule an appointment to discuss recent hospitalization        SECONDARY DISCHARGE DIAGNOSES  Diagnosis: Scapular fracture  Assessment and Plan of Treatment: May weight bearing as tolerated left upper extremity, range of motion as toelrated   follow up with ortho Dr. Ramirez in 1-2 weeks please call to schedule an appointment    Diagnosis: Pneumothorax, left  Assessment and Plan of Treatment:     Diagnosis: Lung contusion  Assessment and Plan of Treatment:      PRINCIPAL DISCHARGE DIAGNOSIS  Diagnosis: Fracture of multiple ribs of left side  Assessment and Plan of Treatment: Please utilize Tylenol  prior to using oxycodone/narcotics.  Do not take more than 4gm of Tylenol in a 24 hour period. Please stagger medications for around the clock pain control.  if you are utilizing narcotics, you may use over the counter Miralax, Senna or Colace to prevent constipation.  Follow up with trauma Dr. Laureano ONLY as needed basis- Rib fractures take time to heal on their own and don't require any necessary follow up unless you have any questions or concerns  You may use Salanpas 1% topical lidocaine patches over the counter for topical pain relief  Please use incentive spirometry 10 times an hour while awake to prevent pneumoia   Return to nearest emergency room with worsening shortness of breath, pain not controlled on discharge medications, fever, chills or syncope  Please follow up with your regular medical doctor Dr. Chance Rendon in 1 week, please call to schedule an appointment to discuss recent hospitalization        SECONDARY DISCHARGE DIAGNOSES  Diagnosis: Scapular fracture  Assessment and Plan of Treatment: May weight bearing as tolerated left upper extremity, range of motion as toelrated   follow up with ortho Dr. Ramirez in 1-2 weeks please call to schedule an appointment    Diagnosis: Pneumothorax, left  Assessment and Plan of Treatment:     Diagnosis: Lung contusion  Assessment and Plan of Treatment:

## 2024-05-20 NOTE — OCCUPATIONAL THERAPY INITIAL EVALUATION ADULT - LIVES WITH, PROFILE
Per pt, pt resides at home with spouse and step son, in an apartment, +few steps at front entrance but mostly uses side entrance with no stairs to negotiate, +elevator access to 6th floor apt, PTA ind amb and ADLs, owns a cane?, +shower chair, doesn't drive but is a community ambulator and goes to the grocery store, spouse drives. +[place], wears glasses, +RH/spouse Per pt, pt resides at home with spouse and step son, in an apartment, +few steps at front entrance but mostly uses side entrance with no stairs to negotiate, +elevator access to 6th floor apt, PTA ind amb and ADLs, owns a cane?, +shower chair, doesn't drive but is a community ambulator and goes to the grocery store. +[place], wears glasses. (-) /spouse Pt lives with spouse and step son, in an apartment, +few steps at front entrance but mostly uses side entrance with no stairs to negotiate, +elevator access to 6th floor apt, PTA ind amb and ADLs, owns a cane, not used, +shower chair, wears glasses. (-) /spouse

## 2024-05-20 NOTE — DISCHARGE NOTE PROVIDER - CARE PROVIDERS DIRECT ADDRESSES
,[email or web address]

## 2024-05-20 NOTE — PROGRESS NOTE ADULT - REASON FOR ADMISSION
fall w/ acute displaced rib fx and pneumo/hemothorax

## 2024-05-20 NOTE — OCCUPATIONAL THERAPY INITIAL EVALUATION ADULT - ADL RETRAINING, OT EVAL
Patient will dress upper body (I) in 4 weeks. Patient will dress lower body (I) AE as needed in 4 weeks

## 2024-05-20 NOTE — PROGRESS NOTE ADULT - ASSESSMENT
79 year old male PMH afib (no AC or ASA), polycystic kidney diease, PPM (placed in 2019), HTN, Hypothyroidism, IBS, SHRUTHI (not on CPAP),  presents to ED after mechanical fall +HS/no loc, reporting left chest wall pain and left shoulder pain. Primary intact, secondary confirmed let shoulder and chest wall pain. Imaging with L 4,5,6 displaced rib fx and pulm contusion with small pneumo/hemothorax. Trauma consulted for evaluation.    PLAN:   Neurologic:   Respiratory:   - Pain medications PRN  - CXR AM  - IS    Cardiovascular:   - MAP goal > 65    Gastrointestinal/Nutrition:   - Regular diet     Renal/Genitourinary:   - Monitor UOP  - Trend electrolytes     Hematologic:   - Mechanical DVT PPx  - Chemical DVT PPx HOLD    Infectious Disease:   - Monitor for signs of infections   - Trend WBCs    Lines/Tubes:  - PIV    Endocrine:   - Trend glucose levels     Disposition:  SICU
79 year old male PMH afib (no AC or ASA), polycystic kidney diease, PPM (placed in 2019), HTN, Hypothyroidism, IBS, SHRUTHI (not on CPAP),  presents to ED after mechanical fall +HS/no loc, reporting left chest wall pain and left shoulder pain. Primary intact, secondary confirmed let shoulder and chest wall pain. Imaging with L 4,5,6 displaced rib fx and pulm contusion with small pneumo/hemothorax. Trauma consulted for evaluation.    Plan:  - SICU for Rib score 1 and pulm contusion, recommend transfer to floor  - pulling [id number] on IS  - pain control rib protocol  - requires med rec, does not have list, pharmacy not available at this time ([facility])  - dvt ppx  - PT eval  - will perform tertiary exam t/d    Trauma/ACS #[id number] 
ASSESSMENT: 79 year old male PMH afib (no AC or ASA), polycystic kidney diease, PPM (placed in 2019), HTN, Hypothyroidism, IBS, SHRUTHI (not on CPAP),  presents to ED after mechanical fall +HS/no loc, reporting left chest wall pain and left shoulder pain. Primary intact, secondary confirmed let shoulder and chest wall pain. Imaging with L 4,5,6 displaced rib fx and pulm contusion with small pneumo/hemothorax. Trauma consulted for evaluation.    PLAN:  - Tertiary examination today  - Pain control as needed  - On room air, pulling approximately 1500cc on IS  - Home medications restarted  - Appreciate medicine co-management given ELIEZER  - Dispo: Home PT    Trauma/ACS [id number]

## 2024-05-20 NOTE — PROGRESS NOTE ADULT - SUBJECTIVE AND OBJECTIVE BOX
ACUTE CARE SURGERY AND TRAUMA SURGERY PROGRESS NOTE:    ========================================  ACS/TRAUMA SURGERY PAGER [phone]  ========================================    Subjective:  Pt seen and examined at bedside. Pain well controlled. Denies SoB.      Objective:    PE:  Gen: NAD  Resp: airway patent, respirations unlabored, no increased WoB  CVS: RRR  Abd: soft, ND, NT, no rebound or guarding  Ext: no edema, WWP  Neuro: AAOx3, no focal deficits    Vital Signs Last 24 Hrs  T(C): 36.7 (19 May 2024 07:00), Max: 37.3 (18 May 2024 18:48)  T(F): 98.1 (19 May 2024 07:00), Max: 99.1 (18 May 2024 18:48)  HR: 60 (19 May 2024 09:00) (60 - 63)  BP: 148/88 (19 May 2024 09:00) (133/85 - 204/117)  BP(mean): 114 (19 May 2024 09:00) (101 - 146)  RR: 18 (19 May 2024 09:00) (13 - 20)  SpO2: 97% (19 May 2024 09:00) (95% - 100%)    Parameters below as of 19 May 2024 07:00  Patient On (Oxygen Delivery Method): room air        I&O's Detail    18 May 2024 07:01  -  19 May 2024 07:00  --------------------------------------------------------  IN:    IV PiggyBack: 100 mL  Total IN: 100 mL    OUT:    Voided (mL): 550 mL  Total OUT: 550 mL    Total NET: -450 mL      19 May 2024 07:01  -  19 May 2024 09:38  --------------------------------------------------------  IN:    IV PiggyBack: 250 mL  Total IN: 250 mL    OUT:    Voided (mL): 300 mL  Total OUT: 300 mL    Total NET: -50 mL          Daily Height in cm: 167.64 (18 May 2024 14:18)    Daily     MEDICATIONS  (STANDING):  acetaminophen   IVPB .. 1000 milliGRAM(s) IV Intermittent every 6 hours  chlorhexidine 2% Cloths 1 Application(s) Topical <User Schedule>  enoxaparin Injectable 40 milliGRAM(s) SubCutaneous <User Schedule>  lidocaine   4% Patch 1 Patch Transdermal every 24 hours  polyethylene glycol 3350 17 Gram(s) Oral every 12 hours  senna 2 Tablet(s) Oral at bedtime    MEDICATIONS  (PRN):  oxyCODONE    IR 5 milliGRAM(s) Oral every 4 hours PRN Moderate Pain (4 - 6)  oxyCODONE    IR 10 milliGRAM(s) Oral every 4 hours PRN Severe Pain (7 - 10)      LABS:                        12.5   7.12  )-----------( 143      ( 19 May 2024 03:09 )             38.2     05-19    137  |  105  |  40<H>  ----------------------------<  176<H>  4.7   |  22  |  1.89<H>    Ca    9.5      19 May 2024 03:09  Phos  2.3     05-  Mg     2.1     -    TPro  5.2<L>  /  Alb  3.3  /  TBili  0.5  /  DBili  x   /  AST  15  /  ALT  9<L>  /  AlkPhos  42  05-18    PT/INR - ( 19 May 2024 03:09 )   PT: 11.5 sec;   INR: 1.10 ratio         PTT - ( 19 May 2024 03:09 )  PTT:29.5 sec  Urinalysis Basic - ( 19 May 2024 03:09 )    Color: Yellow / Appearance: Clear / S.017 / pH: x  Gluc: 176 mg/dL / Ketone: Negative mg/dL  / Bili: Negative / Urobili: 0.2 mg/dL   Blood: x / Protein: 100 mg/dL / Nitrite: Negative   Leuk Esterase: Negative / RBC: 3 /HPF / WBC 0 /HPF   Sq Epi: x / Non Sq Epi: 0 /HPF / Bacteria: Negative /HPF        RADIOLOGY & ADDITIONAL STUDIES:            
SICU Consultation Note  =====================================================   78yo male pt with PMH of afib (no AC or ASA), polycystic kidney diease, PPM (placed in 2019), HTN, Hypothyroidism, IBS, SHRUTHI (not on CPAP),  presents to ED after mechanical fall at home. Reports left upper back pain, left shoulder pain, and left chest wall pain pain  +HS/No LOC. Reports he tripped on uneven floor and fell backward hitting his head on the floor. Denies  headache, dizziness, visual changes or N/V. Denies neck pain. Denies sensory changes or weakness to extremities. Denies CP/SOB/ABD pain. Denies hip pain. Denies fever, chills, or recent sickness.    In the Ed initially potassium 6.3 on labs s/p shift by ED team now 3.6. Labs otherwise unremarkable. Trauma scans revealed L 4,5,6th diaplced rib fractures, plumonary contusion, small apical pneumo and small hemothorax.    (18 May 2024 21:30)    PAST MEDICAL & SURGICAL HISTORY:  HTN (hypertension)  10 years    Hypothyroidism  last tft's - 1 month ago    Erectile dysfunction  takes viagra prn    IBS (irritable bowel syndrome)    Osteoarthritis      Bulging disc  cervical spine 10 years ago - deneies any radicular pain      Hemorrhoids without mention of complications      Polycystic kidney  vague history unsure as to diagnosis, states "functioning normal "      History of appendectomy  1990's      Lipoma  multiple  times - last 10 years from neck and thigh in past      Ulnar nerve entrapment  right wrist decompression - 2000      Hernia NEC  repair approximately 10 years ago        Home Meds: Home Medications:  acetaminophen 325 mg oral tablet: 2 tab(s) orally every 4 hours, As needed, Temp greater or equal to 38.5C (101.3F) (10 Feb 2021 10:12)  amLODIPine 5 mg oral tablet: 1 tab(s) orally once a day (06 Feb 2021 01:39)  Eliquis 5 mg oral tablet: 1 tab(s) orally 2 times a day (06 Feb 2021 01:39)  levothyroxine 100 mcg (0.1 mg) oral tablet: 1 tab(s) orally once a day (06 Feb 2021 01:39)  polyethylene glycol 3350 oral powder for reconstitution: 17 gram(s) orally once a day as needed for constipation  (10 Feb 2021 10:13)  senna oral tablet: 2 tab(s) orally once a day (at bedtime) as needed for constipation  (10 Feb 2021 10:13)  Toprol-XL 25 mg oral tablet, extended release: 1 tab(s) orally once a day (06 Feb 2021 01:39)    Allergies: Allergies    No Known Allergies    Intolerances      Soc:   Advanced Directives: Presumed Full Code     ROS:    REVIEW OF SYSTEMS    [ ] A ten-point review of systems was otherwise negative except as noted.  [ ] Due to altered mental status/intubation, subjective information were not able to be obtained from the patient. History was obtained, to the extent possible, from review of the chart and collateral sources of information.      CURRENT MEDICATIONS:   --------------------------------------------------------------------------------------  Neurologic Medications  HYDROmorphone  Injectable 0.2 milliGRAM(s) IV Push once    Respiratory Medications    Cardiovascular Medications    Gastrointestinal Medications    Genitourinary Medications    Hematologic/Oncologic Medications    Antimicrobial/Immunologic Medications    Endocrine/Metabolic Medications    Topical/Other Medications    --------------------------------------------------------------------------------------    VITAL SIGNS, INS/OUTS (last 24 hours):  --------------------------------------------------------------------------------------  ICU Vital Signs Last 24 Hrs  T(C): 37 (18 May 2024 22:17), Max: 37.3 (18 May 2024 18:48)  T(F): 98.6 (18 May 2024 22:17), Max: 99.1 (18 May 2024 18:48)  HR: 60 (18 May 2024 22:17) (60 - 63)  BP: 192/112 (18 May 2024 22:17) (174/106 - 204/117)  BP(mean): 146 (18 May 2024 22:17) (128 - 146)  ABP: --  ABP(mean): --  RR: 19 (18 May 2024 22:17) (16 - 20)  SpO2: 95% (18 May 2024 22:17) (95% - 98%)    O2 Parameters below as of 18 May 2024 22:17  Patient On (Oxygen Delivery Method): room air          I&O's Summary    --------------------------------------------------------------------------------------    EXAM:  General: NAD  HEENT: Normocephalic, atraumatic, EOMI, PEERLA.  Neck: Soft, midline trachea.  Chest: No chest wall tenderness.   Cardiac: S1, S2, RRR  Respiratory: Bilateral breath sounds, clear and equal bilaterally  Abdomen: Soft, non-distended, non-tender, no rebound, no guarding, no masses palpated  Groin: Normal appearing  Ext: palp radial b/l UE, b/l DP palp in Lower Extrem, motor and sensory grossly intact in all 4 extremities  Back: no TTP, no palpable runoff/stepoff/deformity  Rectal: No nisa blood, LARRY with good tone    LABS  --------------------------------------------------------------------------------------  Labs:  CAPILLARY BLOOD GLUCOSE                              14.4   11.26 )-----------( 155      ( 18 May 2024 17:52 )             45.3       Auto Neutrophil %: 91.2 % (05-18-24 @ 17:52)  Band Neutrophils %: 1.8 % (05-18-24 @ 17:52)    05-18    142  |  114<H>  |  35<H>  ----------------------------<  111<H>  3.6   |  20<L>  |  1.55<H>      Calcium: 7.4 mg/dL (05-18-24 @ 20:30)      LFTs:             5.2  | 0.5  | 15       ------------------[42      ( 18 May 2024 20:30 )  3.3  | x    | 9           Lipase:25     Amylase:x         Blood Gas Venous - Lactate: 1.7 mmol/L (05-18-24 @ 17:52)      Coags:     11.2   ----< 1.07    ( 18 May 2024 17:52 )     30.4                Urinalysis Basic - ( 18 May 2024 20:30 )    Color: x / Appearance: x / SG: x / pH: x  Gluc: 111 mg/dL / Ketone: x  / Bili: x / Urobili: x   Blood: x / Protein: x / Nitrite: x   Leuk Esterase: x / RBC: x / WBC x   Sq Epi: x / Non Sq Epi: x / Bacteria: x  
SURGERY DAILY PROGRESS NOTE    SUBJECTIVE: Patient downgraded from the sicu to the floor. Patient seen and evaluated on AM rounds. OOB to chair on exam. Pulling [id number] on IS. Pain is well controlled.    OBJECTIVE:  Vital Signs Last 24 Hrs  T(C): 36.7 (20 May 2024 08:06), Max: 37 (19 May 2024 15:54)  T(F): 98.1 (20 May 2024 08:06), Max: 98.6 (19 May 2024 15:54)  HR: 62 (20 May 2024 08:06) (58 - 62)  BP: 129/86 (20 May 2024 08:06) (129/86 - 178/98)  BP(mean): 120 (19 May 2024 13:00) (120 - 131)  RR: 20 (20 May 2024 08:06) (20 - 28)  SpO2: 97% (20 May 2024 08:06) (96% - 100%)    Parameters below as of 20 May 2024 08:06  Patient On (Oxygen Delivery Method): room air      I&O's Detail    19 May 2024 07:01  -  20 May 2024 07:00  --------------------------------------------------------  IN:    IV PiggyBack: 250 mL    Oral Fluid: 720 mL  Total IN: 970 mL    OUT:    Voided (mL): 950 mL  Total OUT: 950 mL    Total NET: 20 mL     Daily   MEDICATIONS  (STANDING):  acetaminophen     Tablet .. 975 milliGRAM(s) Oral every 8 hours  chlorhexidine 2% Cloths 1 Application(s) Topical <User Schedule>  enoxaparin Injectable 40 milliGRAM(s) SubCutaneous <User Schedule>  FLUoxetine 10 milliGRAM(s) Oral daily  levothyroxine 100 MICROGram(s) Oral daily  lidocaine   4% Patch 1 Patch Transdermal every 24 hours  metoprolol succinate ER 50 milliGRAM(s) Oral daily  polyethylene glycol 3350 17 Gram(s) Oral every 12 hours  sacubitril 49 mG/valsartan 51 mG 1 Tablet(s) Oral two times a day  senna 2 Tablet(s) Oral at bedtime  spironolactone 25 milliGRAM(s) Oral daily    MEDICATIONS  (PRN):  oxyCODONE    IR 2.5 milliGRAM(s) Oral every 4 hours PRN Moderate Pain (4 - 6)  oxyCODONE    IR 5 milliGRAM(s) Oral every 4 hours PRN Severe Pain (7 - 10)    LABS:                        13.0   7.88  )-----------( 158      ( 20 May 2024 06:55 )             40.3     05-20    139  |  104  |  42<H>  ----------------------------<  104<H>  4.3   |  22  |  2.05<H>    Ca    9.8      20 May 2024 06:55  Phos  2.7     05-20  Mg     2.2     05-20    TPro  5.2<L>  /  Alb  3.3  /  TBili  0.5  /  DBili  x   /  AST  15  /  ALT  9<L>  /  AlkPhos  42  05-18    PT/INR - ( 19 May 2024 03:09 )   PT: 11.5 sec;   INR: 1.10 ratio         PTT - ( 19 May 2024 03:09 )  PTT:29.5 sec  Urinalysis Basic - ( 20 May 2024 06:55 )    Color: x / Appearance: x / SG: x / pH: x  Gluc: 104 mg/dL / Ketone: x  / Bili: x / Urobili: x   Blood: x / Protein: x / Nitrite: x   Leuk Esterase: x / RBC: x / WBC x   Sq Epi: x / Non Sq Epi: x / Bacteria: x    PHYSICAL EXAM:  Constitutional: No acute distress  Pulmonary: Symmetric chest rise bilaterally, no increased WOB, on room air  Gastrointestinal: Abdomen soft, nontender, nondistended  Extremities: FROM, normal strength, warm to touch. LUE in sling

## 2024-05-20 NOTE — DISCHARGE NOTE NURSING/CASE MANAGEMENT/SOCIAL WORK - PATIENT PORTAL LINK FT
You can access the FollowMyHealth Patient Portal offered by [facility] by registering at the following website: [email or web address]. By joining [email or web address]’s FollowMyHealth portal, you will also be able to view your health information using other applications (apps) compatible with our system.

## 2024-05-20 NOTE — DISCHARGE NOTE PROVIDER - CARE PROVIDER_API CALL
Que Ramirez  Orthopaedic Surgery  [address], [address]  [place] [address]  Phone: [phone]  Fax: [phone]  Follow Up Time: 1 week    Adriana Laureano  Surgical Critical Care  [address], [address]  [place] [address]  Phone: [phone]  Fax: [phone]  Follow Up Time: Routine   Que Ramirez  Orthopaedic Surgery  [address], [address]  [place] [address]  Phone: [phone]  Fax: [phone]  Follow Up Time: 1 week    Adriana Laureano  Surgical Critical Care  [address], [address]  [place] [address]  Phone: [phone]  Fax: [phone]  Follow Up Time: Routine    Jorge Rendon [facility]  Internal Medicine  [address] [place] [address]  Phone: [phone]  Fax: [phone]  Follow Up Time: 1 week

## 2024-11-20 NOTE — H&P ADULT - SKIN
Can this patient be moved to the following day please on the 17th?   detailed exam color normal/warm and dry
